# Patient Record
Sex: MALE | Race: BLACK OR AFRICAN AMERICAN | NOT HISPANIC OR LATINO | Employment: FULL TIME | URBAN - METROPOLITAN AREA
[De-identification: names, ages, dates, MRNs, and addresses within clinical notes are randomized per-mention and may not be internally consistent; named-entity substitution may affect disease eponyms.]

---

## 2021-07-13 ENCOUNTER — TRANSCRIBE ORDERS (OUTPATIENT)
Dept: PHYSICAL THERAPY | Facility: CLINIC | Age: 56
End: 2021-07-13

## 2021-07-13 DIAGNOSIS — M47.818 SI JOINT ARTHRITIS: ICD-10-CM

## 2021-07-13 DIAGNOSIS — M46.1 INFLAMMATION OF SACROILIAC JOINT (HCC): Primary | ICD-10-CM

## 2021-07-28 ENCOUNTER — TREATMENT (OUTPATIENT)
Dept: PHYSICAL THERAPY | Facility: CLINIC | Age: 56
End: 2021-07-28

## 2021-07-28 DIAGNOSIS — M54.50 ACUTE BILATERAL LOW BACK PAIN WITHOUT SCIATICA: Primary | ICD-10-CM

## 2021-07-28 PROCEDURE — 97161 PT EVAL LOW COMPLEX 20 MIN: CPT | Performed by: PHYSICAL THERAPIST

## 2021-07-28 PROCEDURE — 97110 THERAPEUTIC EXERCISES: CPT | Performed by: PHYSICAL THERAPIST
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
## 2021-08-04 ENCOUNTER — TREATMENT (OUTPATIENT)
Dept: PHYSICAL THERAPY | Facility: CLINIC | Age: 56
End: 2021-08-04

## 2021-08-04 DIAGNOSIS — M54.50 ACUTE BILATERAL LOW BACK PAIN WITHOUT SCIATICA: Primary | ICD-10-CM

## 2021-08-04 PROCEDURE — 97110 THERAPEUTIC EXERCISES: CPT | Performed by: PHYSICAL THERAPIST

## 2021-08-04 NOTE — PROGRESS NOTES
Physical Therapy Daily Treatment Note    Patient: Gorge Newman   : 1965  Referring practitioner: Alden Samuel MD  Date of Initial Visit: Type: THERAPY  Noted: 2021  Today's Date: 2021  Patient seen for 2 sessions         Subjective   Gorge Newman reports: Pt reports that he is doing okay, but his shoulder pain is high.      Objective   See Exercise, Manual, and Modality Logs for complete treatment.     Assessment & Plan     Assessment  Impairments: pain with function  Assessment details: Pt tolerated session well overall without complaint of increased pain. He required cues for some exercises to ensure proper form. PT is still awaiting order for shoulder pain.    Plan  Therapy options: will be seen for skilled physical therapy services  Plan details: Continue with POC.      Visit Diagnoses:    ICD-10-CM ICD-9-CM   1. Acute bilateral low back pain without sciatica  M54.5 724.2     338.19     Progress per Plan of Care       Timed:  Manual Therapy:    0     mins  06544;  Therapeutic Exercise:    28     mins  85000;     Neuromuscular Pooja:    0    mins  27836;    Therapeutic Activity:     0     mins  73650;     Gait Trainin     mins  09636;     Aquatic Therapy     0     mins  10977;     Ultrasound:     0     mins  66848;    Electrical Stimulation:    0     mins  58001 ( );    Untimed:  Electrical Stimulation:    0     mins  95461 ( );  Mechanical Traction:    0     mins  18056;     Timed Treatment:   28   mins   Total Treatment:     28   mins  Maria Guadalupe Melchor, PT  Physical Therapist

## 2021-08-10 ENCOUNTER — TREATMENT (OUTPATIENT)
Dept: PHYSICAL THERAPY | Facility: CLINIC | Age: 56
End: 2021-08-10

## 2021-08-10 DIAGNOSIS — M25.512 ACUTE PAIN OF LEFT SHOULDER: ICD-10-CM

## 2021-08-10 DIAGNOSIS — M54.50 ACUTE BILATERAL LOW BACK PAIN WITHOUT SCIATICA: Primary | ICD-10-CM

## 2021-08-10 PROCEDURE — 97110 THERAPEUTIC EXERCISES: CPT | Performed by: PHYSICAL THERAPIST

## 2021-08-10 PROCEDURE — 97164 PT RE-EVAL EST PLAN CARE: CPT | Performed by: PHYSICAL THERAPIST

## 2021-08-10 PROCEDURE — 97140 MANUAL THERAPY 1/> REGIONS: CPT | Performed by: PHYSICAL THERAPIST
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
## 2021-08-19 ENCOUNTER — TREATMENT (OUTPATIENT)
Dept: PHYSICAL THERAPY | Facility: CLINIC | Age: 56
End: 2021-08-19

## 2021-08-19 DIAGNOSIS — M25.512 ACUTE PAIN OF LEFT SHOULDER: ICD-10-CM

## 2021-08-19 DIAGNOSIS — M54.50 ACUTE BILATERAL LOW BACK PAIN WITHOUT SCIATICA: Primary | ICD-10-CM

## 2021-08-19 PROCEDURE — 97140 MANUAL THERAPY 1/> REGIONS: CPT | Performed by: PHYSICAL THERAPIST

## 2021-08-19 PROCEDURE — G0283 ELEC STIM OTHER THAN WOUND: HCPCS | Performed by: PHYSICAL THERAPIST

## 2021-08-19 PROCEDURE — 97110 THERAPEUTIC EXERCISES: CPT | Performed by: PHYSICAL THERAPIST

## 2021-08-19 NOTE — PROGRESS NOTES
Physical Therapy Daily Treatment Note      Patient: Gorge Newman   : 1965  Referring practitioner: No ref. provider found  Date of Initial Visit: Type: THERAPY  Noted: 2021  Today's Date: 2021  Patient seen for 4 sessions           Subjective   Gorge Newman reports: His shoulder is getting better.     Objective   See Exercise, Manual, and Modality Logs for complete treatment.     Assessment & Plan     Assessment  Assessment details: Pt tolerated session well overall. Progressed exercises as he is making progress.     Plan  Therapy options: will be seen for skilled physical therapy services  Plan details: Continue with POC.        Visit Diagnoses:    ICD-10-CM ICD-9-CM   1. Acute bilateral low back pain without sciatica  M54.5 724.2     338.19   2. Acute pain of left shoulder  M25.512 719.41       Progress per Plan of Care         Timed:  Manual Therapy:    10     mins  08244;  Therapeutic Exercise:    20     mins  19288;     Neuromuscular Pooja:    0    mins  42259;    Therapeutic Activity:     0     mins  95703;     Gait Trainin     mins  93738;     Aquatic Therapy     0     mins  99082;     Ultrasound:     0     mins  99493;    Electrical Stimulation:    0     mins  76594 ( );    Untimed:  Electrical Stimulation:    15     mins  60450 ( );  Mechanical Traction:    0     mins  06711;     Timed Treatment:   30  mins   Total Treatment:     45   mins  Maria Guadalupe Melchor, PT  Physical Therapist

## 2021-08-23 ENCOUNTER — TREATMENT (OUTPATIENT)
Dept: PHYSICAL THERAPY | Facility: CLINIC | Age: 56
End: 2021-08-23

## 2021-08-23 DIAGNOSIS — M25.512 ACUTE PAIN OF LEFT SHOULDER: ICD-10-CM

## 2021-08-23 DIAGNOSIS — M54.50 ACUTE BILATERAL LOW BACK PAIN WITHOUT SCIATICA: Primary | ICD-10-CM

## 2021-08-23 PROCEDURE — 97110 THERAPEUTIC EXERCISES: CPT | Performed by: PHYSICAL THERAPIST

## 2021-08-23 NOTE — PROGRESS NOTES
Physical Therapy Daily Treatment Note      Patient: Gorge Newman   : 1965  Referring practitioner: No ref. provider found  Date of Initial Visit: Type: THERAPY  Noted: 2021  Today's Date: 2021  Patient seen for 5 sessions           Subjective Evaluation    History of Present Illness    Subjective comment: Pt reports that he is doing well today and his pain is reduced.       Objective   See Exercise, Manual, and Modality Logs for complete treatment.     Assessment & Plan     Assessment  Assessment details: Pt tolerated session well today. Progressed all exercises due to progress and reduction in pain.    Plan  Plan details: Continue with POC.        Visit Diagnoses:    ICD-10-CM ICD-9-CM   1. Acute bilateral low back pain without sciatica  M54.5 724.2     338.19   2. Acute pain of left shoulder  M25.512 719.41       Progress per Plan of Care           Timed:  Manual Therapy:    0     mins  93628;  Therapeutic Exercise:    25     mins  74624;     Neuromuscular Pooja:    0    mins  19562;    Therapeutic Activity:     0     mins  80481;     Gait Trainin     mins  75526;     Aquatic Therapy     00     mins  17300;     Ultrasound:     0     mins  10204;    Electrical Stimulation:    0     mins  10103 ( );    Untimed:  Electrical Stimulation:    0     mins  53804 ( );  Mechanical Traction:    0     mins  92076;     Timed Treatment:   25   mins   Total Treatment:     25   mins  Maria Guadalupe Melchor, PT  Physical Therapist

## 2021-09-13 ENCOUNTER — TREATMENT (OUTPATIENT)
Dept: PHYSICAL THERAPY | Facility: CLINIC | Age: 56
End: 2021-09-13

## 2021-09-13 DIAGNOSIS — M54.50 ACUTE BILATERAL LOW BACK PAIN WITHOUT SCIATICA: ICD-10-CM

## 2021-09-13 DIAGNOSIS — M25.512 ACUTE PAIN OF LEFT SHOULDER: Primary | ICD-10-CM

## 2021-09-13 NOTE — PROGRESS NOTES
Physical Therapy Daily Treatment Note and discharge    Patient: Gorge Newman   : 1965  Referring practitioner: No ref. provider found  Date of Initial Visit: Type: THERAPY  Noted: 2021  Today's Date: 2021  Patient seen for 6 sessions           Subjective   Gorge Newman reports: He is doing a lot better. He reports that his back pain is now 1/10 overall and is doing well. Pt reports that his shoulder bothers him a little; however, it is significantly better (4-5/10 pain overall). Pt feels that he is ready to discharge to an independent [facility] and he states that if his shoulder does not get better, he will return to the MD for a follow up and request further physical therapy.     Objective          Active Range of Motion     Additional Active Range of Motion Details  Lumbar  Active Range              Flexion   75%    Extension  70%         Left Right   Sidebending  70% 70%   Rotation  60% 60%       Strength/Myotome Testing     Left Shoulder     Planes of Motion   Flexion: 4+   Abduction: 4+   External rotation at 0°: 4+   Internal rotation at 0°: 4+     Right Shoulder     Planes of Motion   Flexion: 4+   Abduction: 4+   External rotation at 0°: 4   Internal rotation at 0°: 4     Left Hip   Planes of Motion   Flexion: 4+  Extension: 4+  Abduction: 4+  Adduction: 4+    Right Hip   Planes of Motion   Flexion: 4+  Abduction: 4+  Adduction: 4+    Ambulation     Observational Gait   Decreased walking speed.       See Exercise, Manual, and Modality Logs for complete treatment.     Assessment & Plan     Assessment  Impairments: lacks appropriate home exercise program and pain with function  Assessment details: Pt has made good overall improvements in his lumbar pain and shoulder pain. He is now experience minimal pain and has good overall UE and LE strength. Pt rathersto do exercises at home and follow up at a further date for additional physical therapy.    Goals  Plan Goals: LOW BACK PROBLEMS:    1. The  patient complains of low back pain.  LTG 1: 6 weeks:  The patient will report a pain rating of 3/10 or better in order to improve  tolerance to activities of daily living and improve sleep quality.  STATUS:  New  STG 1a: 3 weeks:  The patient will report a pain rating of 5/10 or better.  STATUS:  New  TREATMENT:  Therapeutic exercises, manual therapy, aquatic therapy, home exercise   instruction, and modalities as needed for pain to include:  electrical stimulation, moist heat, ice,   ultrasound, and diathermy.      2. The patient demonstrates weakness of the B hip.  LTG 2: 6 weeks:  The patient will demonstrate 4+ /5 strength for B hip flexion, abduction,  and extension in order to improve hip stability.  STATUS:  New  STG 2a: 3 weeks:  The patient will demonstrate 4 /5 strength for B hip flexion, abduction,  and extension.  STATUS:  New  TREATMENT: Therapeutic exercises, manual therapy, aquatic therapy, home exercise instruction,  and modalities as needed for pain to include:  electrical stimulation, moist heat, ice, ultrasound, and   diathermy.      3. The patient has gait dysfunction.  LTG 3: 6 weeks:  The patient will ambulate without assistive device, independently, for   community distances with minimal limp to the B lower extremity in order to improve mobility and allow   patient to perform activities such as grocery shopping with greater ease.  STATUS:  New  TREATMENT: Gait training, aquatic therapy, therapeutic exercise, and home exercise instruction.    4. The patient has limited lumbar AROM  LTG 4: 6 weeks:  The patient will demonstrate lumbar AROM as follows: 90% of flexion.  STATUS:  New  STG 4a: 3 weeks: The patient will demonstrate lumbar AROM as follows: 80% of flexion and 60% degrees of extension.  STATUS:  New  TREATMENT: Manual therapy, therapeutic exercise, home exercise instruction, and modalities as needed to include: moist heat, electrical stimulation, and ultrasound.    Plan  Therapy  options: will be seen for skilled physical therapy services  Planned therapy interventions: manual therapy, strengthening, soft tissue mobilization and home exercise program  Plan details: Discharge to home exercises.         Visit Diagnoses:  No diagnosis found.    Other: discharge to [facility].           Timed:  Manual Therapy:    0     mins  99760;  Therapeutic Exercise:    0     mins  23014;     Neuromuscular Pooja:    0    mins  82067;    Therapeutic Activity:     0     mins  91983;     Gait Trainin     mins  35154;     Aquatic Therapy     0     mins  55320;     Ultrasound:     0     mins  18646;    Electrical Stimulation:    0     mins  01371 ( );    Untimed:  Electrical Stimulation:    0     mins  94903 ( );  Mechanical Traction:    0     mins  90680;     Timed Treatment:   0   mins   Total Treatment:     0   mins  Maria Guadalupe Melchor PT  Physical Therapist

## 2021-12-15 PROCEDURE — U0004 COV-19 TEST NON-CDC HGH THRU: HCPCS | Performed by: NURSE PRACTITIONER

## 2023-01-20 ENCOUNTER — OFFICE VISIT (OUTPATIENT)
Dept: FAMILY MEDICINE CLINIC | Facility: CLINIC | Age: 58
End: 2023-01-20
Payer: COMMERCIAL

## 2023-01-20 ENCOUNTER — PATIENT ROUNDING (BHMG ONLY) (OUTPATIENT)
Dept: FAMILY MEDICINE CLINIC | Facility: CLINIC | Age: 58
End: 2023-01-20
Payer: COMMERCIAL

## 2023-01-20 VITALS
SYSTOLIC BLOOD PRESSURE: 150 MMHG | OXYGEN SATURATION: 100 % | BODY MASS INDEX: 24.98 KG/M2 | WEIGHT: 178.4 LBS | HEIGHT: 71 IN | DIASTOLIC BLOOD PRESSURE: 94 MMHG | HEART RATE: 69 BPM

## 2023-01-20 DIAGNOSIS — E78.5 HYPERLIPIDEMIA, UNSPECIFIED HYPERLIPIDEMIA TYPE: ICD-10-CM

## 2023-01-20 DIAGNOSIS — I10 HYPERTENSION, UNSPECIFIED TYPE: Primary | ICD-10-CM

## 2023-01-20 DIAGNOSIS — Z23 NEED FOR TETANUS, DIPHTHERIA, AND ACELLULAR PERTUSSIS (TDAP) VACCINE: ICD-10-CM

## 2023-01-20 PROCEDURE — 99203 OFFICE O/P NEW LOW 30 MIN: CPT | Performed by: NURSE PRACTITIONER

## 2023-01-20 PROCEDURE — 90471 IMMUNIZATION ADMIN: CPT | Performed by: NURSE PRACTITIONER

## 2023-01-20 PROCEDURE — 90715 TDAP VACCINE 7 YRS/> IM: CPT | Performed by: NURSE PRACTITIONER

## 2023-01-20 RX ORDER — SILDENAFIL 100 MG/1
100 TABLET, FILM COATED ORAL DAILY PRN
COMMUNITY
Start: 2023-01-12

## 2023-01-20 RX ORDER — ATORVASTATIN CALCIUM 20 MG/1
20 TABLET, FILM COATED ORAL DAILY
COMMUNITY
Start: 2023-01-12 | End: 2023-02-24 | Stop reason: SDUPTHER

## 2023-01-20 RX ORDER — LISINOPRIL AND HYDROCHLOROTHIAZIDE 25; 20 MG/1; MG/1
1 TABLET ORAL DAILY
COMMUNITY
Start: 2023-01-12 | End: 2023-02-24 | Stop reason: SDUPTHER

## 2023-01-20 RX ORDER — DICLOFENAC SODIUM 75 MG/1
TABLET, DELAYED RELEASE ORAL
COMMUNITY
Start: 2022-12-15

## 2023-01-20 RX ORDER — AMLODIPINE BESYLATE 5 MG/1
5 TABLET ORAL DAILY
Qty: 90 TABLET | Refills: 0 | Status: SHIPPED | OUTPATIENT
Start: 2023-01-20 | End: 2023-02-24 | Stop reason: SDUPTHER

## 2023-01-20 RX ORDER — BENZONATATE 200 MG/1
CAPSULE ORAL
COMMUNITY
Start: 2023-01-13

## 2023-01-20 RX ORDER — LORATADINE 10 MG/1
10 TABLET ORAL DAILY
COMMUNITY
Start: 2023-01-13

## 2023-01-20 NOTE — ASSESSMENT & PLAN NOTE
Lipid abnormalities are unknown at this time.  Will obtain labs from prior PCP and collect Lipid Panel in 1 month..  Continue Atorvastatin 20mg daily as previously prescribed.  Lipids will be reassessed 1 month..

## 2023-01-20 NOTE — PROGRESS NOTES
"Chief Complaint  Hypertension, Med Refill, and Establish Care    Subjective          Gorge Newman presents to [facility]  History of Present Illness  Gorge Newman is a 57 year old male here to establish care.  He recently moved from [place] to [place] and was going to [facility], and saw Dr. Alden Samuel.  He is also here to follow-up on HTN.    HTN - currently taking Lisinopril-HCTZ daily and tolerating well.  Denies SOB, chest pain, heart palpitations.    HLD - currently taking Atorvastatin and denies side effects to medications.     He reports just got refills of all medications about 2 weeks ago and denies needing refills today.    He denies any other complaints today.             Review of Systems   Respiratory: Negative for shortness of breath.    Cardiovascular: Negative for chest pain, palpitations and leg swelling.         Objective   Vital Signs:   /94 (BP Location: Left arm)   Pulse 69   Ht 180.3 cm (71\")   Wt 80.9 kg (178 lb 6.4 oz)   SpO2 100%   BMI 24.88 kg/m²     Physical Exam  Vitals and nursing note reviewed.   Constitutional:       Appearance: Normal appearance.   HENT:      Head: Normocephalic and atraumatic.   Eyes:      Conjunctiva/sclera: Conjunctivae normal.   Cardiovascular:      Rate and Rhythm: Normal rate and regular rhythm.   Pulmonary:      Effort: Pulmonary effort is normal. No respiratory distress.      Breath sounds: Normal breath sounds. No wheezing, rhonchi or rales.   Skin:     General: Skin is warm and dry.   Neurological:      Mental Status: He is alert.   Psychiatric:         Mood and Affect: Mood normal.        Result Review :                 Assessment and Plan    Diagnoses and all orders for this visit:    1. Hypertension, unspecified type (Primary)  Assessment & Plan:  Hypertension is Uncontrolled on Lisinopril/HCTZ today..  Dietary sodium restriction.  Regular aerobic exercise.  Medication changes per " orders.   Continue Lisinopril-HCTZ 20-25mg daily  Start Amlodpine 5mg daily.  Blood pressure will be reassessed in 4 weeks.    Orders:  -     amLODIPine (NORVASC) 5 MG tablet; Take 1 tablet by mouth Daily.  Dispense: 90 tablet; Refill: 0    2. Hyperlipidemia, unspecified hyperlipidemia type  Assessment & Plan:  Lipid abnormalities are unknown at this time.  Will obtain labs from prior PCP and collect Lipid Panel in 1 month..  Continue Atorvastatin 20mg daily as previously prescribed.  Lipids will be reassessed 1 month..      3. Need for tetanus, diphtheria, and acellular pertussis (Tdap) vaccine  Comments:  Discussed importance of and recommended Tdap today and patient agreed.  Orders:  -     Tdap Vaccine Greater Than or Equal To 6yo IM      Follow Up   Return in about 1 month (around 2/20/2023) for Follow-up Blood Pressure - medication change & get labs at this appt..  Patient was given instructions and counseling regarding his condition or for health maintenance advice. Please see specific information pulled into the AVS if appropriate.

## 2023-01-20 NOTE — ASSESSMENT & PLAN NOTE
Hypertension is Uncontrolled on Lisinopril/HCTZ today..  Dietary sodium restriction.  Regular aerobic exercise.  Medication changes per orders.   Continue Lisinopril-HCTZ 20-25mg daily  Start Amlodpine 5mg daily.  Blood pressure will be reassessed in 4 weeks.

## 2023-02-24 ENCOUNTER — OFFICE VISIT (OUTPATIENT)
Dept: FAMILY MEDICINE CLINIC | Facility: CLINIC | Age: 58
End: 2023-02-24
Payer: COMMERCIAL

## 2023-02-24 VITALS
HEIGHT: 71 IN | WEIGHT: 175 LBS | HEART RATE: 80 BPM | OXYGEN SATURATION: 95 % | RESPIRATION RATE: 16 BRPM | SYSTOLIC BLOOD PRESSURE: 120 MMHG | BODY MASS INDEX: 24.5 KG/M2 | DIASTOLIC BLOOD PRESSURE: 70 MMHG

## 2023-02-24 DIAGNOSIS — E78.5 HYPERLIPIDEMIA, UNSPECIFIED HYPERLIPIDEMIA TYPE: ICD-10-CM

## 2023-02-24 DIAGNOSIS — I10 HYPERTENSION, UNSPECIFIED TYPE: Primary | ICD-10-CM

## 2023-02-24 PROCEDURE — 99213 OFFICE O/P EST LOW 20 MIN: CPT | Performed by: NURSE PRACTITIONER

## 2023-02-24 RX ORDER — LISINOPRIL AND HYDROCHLOROTHIAZIDE 25; 20 MG/1; MG/1
1 TABLET ORAL DAILY
Qty: 90 TABLET | Refills: 0 | Status: SHIPPED | OUTPATIENT
Start: 2023-02-24

## 2023-02-24 RX ORDER — AMLODIPINE BESYLATE 5 MG/1
5 TABLET ORAL DAILY
Qty: 90 TABLET | Refills: 0 | Status: SHIPPED | OUTPATIENT
Start: 2023-02-24

## 2023-02-24 RX ORDER — ATORVASTATIN CALCIUM 20 MG/1
20 TABLET, FILM COATED ORAL NIGHTLY
Qty: 90 TABLET | Refills: 0 | Status: SHIPPED | OUTPATIENT
Start: 2023-02-24

## 2023-02-24 NOTE — ASSESSMENT & PLAN NOTE
Hypertension is improving with treatment.  Continue current treatment regimen.  Dietary sodium restriction.  Weight loss.  Regular aerobic exercise.   Labs (CBC, CMP, Lipid Panel) to be done at next visit, patient will fast for labs.  Blood pressure will be reassessed in 3 months.

## 2023-02-24 NOTE — PROGRESS NOTES
"Chief Complaint  Hypertension (F/U)    Subjective          Gorge Newman presents to [facility]  History of Present Illness  Gorge Newman is a 57 year old male here to follow-up on HTN.    HTN - he is currently taking Amlodipine 5mg daily, and Lisinopril 20mg-HCTZ 25mg daily.  He denies SOB, chest pain, heart palpitations and leg swelling.    HLD - he is currently taking Atorvastatin 20mg nightly.  He denies myalgia.    He denies any other complaints today.  He is requesting refills of above medications today.    Discussed getting labs (CBC, CMP, Lipid Panel) today, but our lab department is closed today.  Gave patient option to go over to hospital to do labs or we can do fasting labs at next appointment and patient opted to fast for next appointment and do labs at that time.      Review of Systems   Respiratory: Negative for shortness of breath.    Cardiovascular: Negative for chest pain, palpitations and leg swelling.   Neurological: Negative for dizziness, light-headedness and headaches.         Objective   Vital Signs:   /70   Pulse 80   Resp 16   Ht 180.3 cm (71\")   Wt 79.4 kg (175 lb)   SpO2 95%   BMI 24.41 kg/m²     Physical Exam  Vitals and nursing note reviewed.   Constitutional:       General: He is not in acute distress.     Appearance: Normal appearance. He is not ill-appearing.   HENT:      Head: Normocephalic and atraumatic.   Eyes:      Conjunctiva/sclera: Conjunctivae normal.      Pupils: Pupils are equal, round, and reactive to light.   Cardiovascular:      Rate and Rhythm: Normal rate and regular rhythm.      Heart sounds: Normal heart sounds. No murmur heard.  Pulmonary:      Effort: Pulmonary effort is normal. No respiratory distress.      Breath sounds: Normal breath sounds. No wheezing.   Skin:     General: Skin is warm and dry.   Neurological:      Mental Status: He is alert.   Psychiatric:         Mood and Affect: Mood normal.        Result Review : "                 Assessment and Plan    Diagnoses and all orders for this visit:    1. Hypertension, unspecified type (Primary)  Assessment & Plan:  Hypertension is improving with treatment.  Continue current treatment regimen.  Dietary sodium restriction.  Weight loss.  Regular aerobic exercise.   Labs (CBC, CMP, Lipid Panel) to be done at next visit, patient will fast for labs.  Blood pressure will be reassessed in 3 months.    Orders:  -     lisinopril-hydrochlorothiazide (PRINZIDE,ZESTORETIC) 20-25 MG per tablet; Take 1 tablet by mouth Daily.  Dispense: 90 tablet; Refill: 0  -     amLODIPine (NORVASC) 5 MG tablet; Take 1 tablet by mouth Daily.  Dispense: 90 tablet; Refill: 0    2. Hyperlipidemia, unspecified hyperlipidemia type  Assessment & Plan:  Unknown cholesterol level, as patient transferred to us from PCP in [place] and we do not have records for him at this time.  Our lab department in office is closed now.  Recommended patient fast (no food/drink) and do labs at next appointment in 3 months for physical and patient agreed.    Orders:  -     atorvastatin (LIPITOR) 20 MG tablet; Take 1 tablet by mouth Every Night.  Dispense: 90 tablet; Refill: 0      Follow Up   Return in about 3 months (around 5/24/2023) for Annual physical W/LABS same day.  Patient was given instructions and counseling regarding his condition or for health maintenance advice. Please see specific information pulled into the AVS if appropriate.

## 2023-02-24 NOTE — ASSESSMENT & PLAN NOTE
Unknown cholesterol level, as patient transferred to us from PCP in [place] and we do not have records for him at this time.  Our lab department in office is closed now.  Recommended patient fast (no food/drink) and do labs at next appointment in 3 months for physical and patient agreed.

## 2023-04-17 ENCOUNTER — OFFICE VISIT (OUTPATIENT)
Dept: FAMILY MEDICINE CLINIC | Facility: CLINIC | Age: 58
End: 2023-04-17
Payer: COMMERCIAL

## 2023-04-17 VITALS
HEIGHT: 71 IN | DIASTOLIC BLOOD PRESSURE: 80 MMHG | WEIGHT: 175 LBS | OXYGEN SATURATION: 98 % | HEART RATE: 58 BPM | SYSTOLIC BLOOD PRESSURE: 130 MMHG | BODY MASS INDEX: 24.5 KG/M2

## 2023-04-17 DIAGNOSIS — Z11.4 ENCOUNTER FOR SCREENING FOR HUMAN IMMUNODEFICIENCY VIRUS (HIV): ICD-10-CM

## 2023-04-17 DIAGNOSIS — I10 HYPERTENSION, UNSPECIFIED TYPE: ICD-10-CM

## 2023-04-17 DIAGNOSIS — Z00.00 ENCOUNTER FOR ANNUAL PHYSICAL EXAM: Primary | ICD-10-CM

## 2023-04-17 DIAGNOSIS — Z11.59 NEED FOR HEPATITIS B SCREENING TEST: ICD-10-CM

## 2023-04-17 DIAGNOSIS — G89.29 CHRONIC LEFT SHOULDER PAIN: ICD-10-CM

## 2023-04-17 DIAGNOSIS — Z11.59 ENCOUNTER FOR HEPATITIS C SCREENING TEST FOR LOW RISK PATIENT: ICD-10-CM

## 2023-04-17 DIAGNOSIS — E78.5 HYPERLIPIDEMIA, UNSPECIFIED HYPERLIPIDEMIA TYPE: ICD-10-CM

## 2023-04-17 DIAGNOSIS — M25.512 CHRONIC LEFT SHOULDER PAIN: ICD-10-CM

## 2023-04-17 DIAGNOSIS — Z12.5 PROSTATE CANCER SCREENING: ICD-10-CM

## 2023-04-17 DIAGNOSIS — R00.1 BRADYCARDIA: ICD-10-CM
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
## 2023-04-21 DIAGNOSIS — I10 HYPERTENSION, UNSPECIFIED TYPE: ICD-10-CM

## 2023-04-21 RX ORDER — AMLODIPINE BESYLATE 5 MG/1
5 TABLET ORAL DAILY
Qty: 30 TABLET | Refills: 0 | Status: SHIPPED | OUTPATIENT
Start: 2023-04-21

## 2023-04-21 NOTE — TELEPHONE ENCOUNTER
Rx Refill Note  Requested Prescriptions     Pending Prescriptions Disp Refills   • amLODIPine (NORVASC) 5 MG tablet [Pharmacy Med Name: AMLODIPINE BESYLATE 5MG TABLETS] 90 tablet 0     Sig: TAKE 1 TABLET BY MOUTH DAILY      Last office visit with prescribing clinician: 4/17/2023   Last telemedicine visit with prescribing clinician: 5/1/2023   Next office visit with prescribing clinician: 7/17/2023                         Would you like a call back once the refill request has been completed: [] Yes [] No    If the office needs to give you a call back, can they leave a voicemail: [] Yes [] No    Aidan Bello MA  04/21/23, 07:56 EDT

## 2023-04-28 PROBLEM — G89.29 CHRONIC LEFT SHOULDER PAIN: Status: ACTIVE | Noted: 2023-04-28

## 2023-04-28 PROBLEM — R00.1 BRADYCARDIA: Status: ACTIVE | Noted: 2023-04-28

## 2023-04-28 PROBLEM — M25.512 CHRONIC LEFT SHOULDER PAIN: Status: ACTIVE | Noted: 2023-04-28
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
## 2023-04-29 NOTE — ASSESSMENT & PLAN NOTE
"Lipid abnormalities are unknown, awaiting lab results.  Nutritional counseling was provided.   Continue Atorvastatin as previously directed.  Lipids will be reassessed when patient returns within 2 weeks for \"fasting\" labs..  "

## 2023-05-03 DIAGNOSIS — Z11.59 NEED FOR HEPATITIS B SCREENING TEST: Primary | ICD-10-CM

## 2023-05-03 NOTE — PROGRESS NOTES
"Hepatitis B Antibody \"reactive\", ordered further Hepatitis B labs to determine if Hepatitis B is active past/present infection or if patient is immune.  "

## 2023-05-24 DIAGNOSIS — I10 HYPERTENSION, UNSPECIFIED TYPE: ICD-10-CM

## 2023-05-24 RX ORDER — AMLODIPINE BESYLATE 5 MG/1
TABLET ORAL
Qty: 30 TABLET | Refills: 0 | Status: SHIPPED | OUTPATIENT
Start: 2023-05-24

## 2023-05-31 DIAGNOSIS — I10 HYPERTENSION, UNSPECIFIED TYPE: ICD-10-CM

## 2023-05-31 RX ORDER — LISINOPRIL AND HYDROCHLOROTHIAZIDE 25; 20 MG/1; MG/1
1 TABLET ORAL DAILY
Qty: 90 TABLET | Refills: 0 | Status: SHIPPED | OUTPATIENT
Start: 2023-05-31

## 2023-06-05 ENCOUNTER — OFFICE VISIT (OUTPATIENT)
Dept: FAMILY MEDICINE CLINIC | Facility: CLINIC | Age: 58
End: 2023-06-05
Payer: COMMERCIAL

## 2023-06-05 VITALS
SYSTOLIC BLOOD PRESSURE: 124 MMHG | RESPIRATION RATE: 14 BRPM | OXYGEN SATURATION: 99 % | DIASTOLIC BLOOD PRESSURE: 82 MMHG | BODY MASS INDEX: 23.66 KG/M2 | WEIGHT: 169 LBS | HEART RATE: 73 BPM | HEIGHT: 71 IN

## 2023-06-05 DIAGNOSIS — I10 HYPERTENSION, UNSPECIFIED TYPE: Primary | ICD-10-CM

## 2023-06-05 DIAGNOSIS — J06.9 ACUTE URI: ICD-10-CM

## 2023-06-05 DIAGNOSIS — E78.5 HYPERLIPIDEMIA, UNSPECIFIED HYPERLIPIDEMIA TYPE: ICD-10-CM

## 2023-06-05 PROCEDURE — 99214 OFFICE O/P EST MOD 30 MIN: CPT | Performed by: NURSE PRACTITIONER

## 2023-06-05 RX ORDER — ATORVASTATIN CALCIUM 20 MG/1
20 TABLET, FILM COATED ORAL NIGHTLY
Qty: 90 TABLET | Refills: 0 | Status: CANCELLED | OUTPATIENT
Start: 2023-06-05

## 2023-06-05 RX ORDER — ATORVASTATIN CALCIUM 20 MG/1
20 TABLET, FILM COATED ORAL NIGHTLY
Qty: 90 TABLET | Refills: 0 | Status: SHIPPED | OUTPATIENT
Start: 2023-06-05

## 2023-06-05 RX ORDER — LORATADINE 10 MG/1
10 TABLET ORAL DAILY
Qty: 90 TABLET | Refills: 1 | Status: SHIPPED | OUTPATIENT
Start: 2023-06-05

## 2023-06-05 RX ORDER — AMLODIPINE BESYLATE 5 MG/1
5 TABLET ORAL DAILY
Qty: 30 TABLET | Refills: 0 | Status: SHIPPED | OUTPATIENT
Start: 2023-06-05

## 2023-06-05 RX ORDER — AMLODIPINE BESYLATE 5 MG/1
5 TABLET ORAL DAILY
Qty: 30 TABLET | Refills: 0 | Status: CANCELLED | OUTPATIENT
Start: 2023-06-05

## 2023-06-05 NOTE — PROGRESS NOTES
"Chief Complaint  Hypertension (Cough and nasal congestion ) and Cough    Subjective          Gorge Newman presents to [facility] for  History of Present Illness  He is here to follow-up on HYPERTENSION and with complaint of Cough.    Cough - he is here with dry cough that started on Wednesday last week felt warm (did not check temperature) with chills and congestion.  He denies sneezing, sore throat, runny nose, and facial pain/pressure.  He went to his doctor in [place] and was given Tessalon Perle 200mg to take TID, but patient only taking it daily.  He is also taking Robitussin and has been taking it daily over the last 3 days, without relief.    HYPERTENSION - he takes his Amlodipine 5mg daily.  He denies SOB, chest pain, heart palpitations, lightheadedness and dizziness.    HYPERLIPIDEMIA - he is taking Atorvastatin 20mg daily.  He denies myalgia.    Colonoscopy - is currently scheduled for to have colonoscopy on Tuesday 6/13/23, at 3pm.        Review of Systems   Constitutional:  Positive for chills.        Felt like had fever, did not check temperature.   HENT:  Positive for congestion. Negative for ear discharge, ear pain, postnasal drip, rhinorrhea, sinus pressure, sinus pain, sneezing, sore throat and trouble swallowing.    Eyes:  Negative for pain and discharge.   Respiratory:  Positive for cough. Negative for shortness of breath.         Dry cough   Cardiovascular:  Negative for chest pain and palpitations.   Musculoskeletal:  Negative for myalgias.   Neurological:  Negative for dizziness and light-headedness.       Objective   Vital Signs:   /82 (BP Location: Right arm, Patient Position: Sitting, Cuff Size: Adult)   Pulse 73   Resp 14   Ht 180.3 cm (70.98\")   Wt 76.7 kg (169 lb)   SpO2 99%   BMI 23.58 kg/m²     Physical Exam  Vitals and nursing note reviewed.   Constitutional:       General: He is not in acute distress.     Appearance: Normal appearance. "   HENT:      Head: Normocephalic and atraumatic.      Right Ear: Tympanic membrane normal. No tenderness. No middle ear effusion. There is no impacted cerumen. Tympanic membrane is not erythematous.      Left Ear: Tympanic membrane normal. No tenderness.  No middle ear effusion. There is no impacted cerumen. Tympanic membrane is not erythematous.      Nose: No congestion or rhinorrhea.      Right Turbinates: Pale. Not enlarged or swollen.      Left Turbinates: Pale. Not enlarged or swollen.      Right Sinus: No maxillary sinus tenderness or frontal sinus tenderness.      Left Sinus: No maxillary sinus tenderness or frontal sinus tenderness.      Mouth/Throat:      Mouth: Mucous membranes are moist.      Tongue: No lesions. Tongue does not deviate from midline.      Palate: No mass and lesions.      Pharynx: No pharyngeal swelling, oropharyngeal exudate, posterior oropharyngeal erythema or uvula swelling.   Eyes:      Conjunctiva/sclera: Conjunctivae normal.      Pupils: Pupils are equal, round, and reactive to light.   Cardiovascular:      Rate and Rhythm: Normal rate and regular rhythm.      Heart sounds: Normal heart sounds. No murmur heard.  Pulmonary:      Effort: Pulmonary effort is normal. No respiratory distress.      Breath sounds: Normal breath sounds. No wheezing.   Musculoskeletal:      Cervical back: Neck supple. No tenderness.   Lymphadenopathy:      Cervical: No cervical adenopathy.   Skin:     General: Skin is warm and dry.      Findings: No erythema.   Neurological:      Mental Status: He is alert.      Result Review :   The following data was reviewed by: ASHLIE Schmitz on 06/05/2023:  CMP          5/1/2023    08:13   CMP   Glucose 89    BUN 15    Creatinine 1.30    Sodium 138    Potassium 4.0    Chloride 100    Calcium 10.4    Total Protein 7.5    Albumin 4.4    Globulin 3.1    Total Bilirubin 0.5    Alkaline Phosphatase 68    AST (SGOT) 20    ALT (SGPT) 17    BUN/Creatinine Ratio 11.5       CBC w/diff          5/1/2023    08:13   CBC w/Diff   WBC 3.56    RBC 5.40    Hemoglobin 12.1    Hematocrit 38.7    MCV 71.7    MCH 22.4    MCHC 31.3    RDW 17.4    Platelets 341      Lipid Panel          5/1/2023    08:13   Lipid Panel   Total Cholesterol 184    Triglycerides 109    HDL Cholesterol 37    VLDL Cholesterol 20    LDL Cholesterol  127                Assessment and Plan    Diagnoses and all orders for this visit:    1. Hypertension, unspecified type (Primary)  Assessment & Plan:  Hypertension is improving with treatment.  Continue current treatment regimen.  Dietary sodium restriction.  Weight loss.  Regular aerobic exercise.  Continue Amlodipine 5mg daily.  Blood pressure will be reassessed in 3 months.    Orders:  -     amLODIPine (NORVASC) 5 MG tablet; Take 1 tablet by mouth Daily.  Dispense: 30 tablet; Refill: 0    2. Hyperlipidemia, unspecified hyperlipidemia type  Assessment & Plan:  Lipid abnormalities are newly identified.  Pharmacotherapy as ordered.  Continue Atorvastatin 20mg nightly.  Lipids will be reassessed in 3 months.    Orders:  -     atorvastatin (LIPITOR) 20 MG tablet; Take 1 tablet by mouth Every Night.  Dispense: 90 tablet; Refill: 0    3. Acute URI  Comments:  Continue RX (prior PCP) Tessalon Perle 200mg TID  RX Delsym TID & Loratadine daily.  F/U if si/sy worsen.  Orders:  -     dextromethorphan 15 MG/5ML syrup; Take 10 mL by mouth 3 (Three) Times a Day As Needed for Cough.  Dispense: 118 mL; Refill: 0  -     loratadine (CLARITIN) 10 MG tablet; Take 1 tablet by mouth Daily.  Dispense: 90 tablet; Refill: 1        Follow Up   Return in about 3 months (around 9/5/2023) for Next scheduled follow up HTN, HLD.  Patient was given instructions and counseling regarding his condition or for health maintenance advice. Please see specific information pulled into the AVS if appropriate.

## 2023-06-05 NOTE — ASSESSMENT & PLAN NOTE
Lipid abnormalities are newly identified.  Pharmacotherapy as ordered.  Continue Atorvastatin 20mg nightly.  Lipids will be reassessed in 3 months.

## 2023-06-05 NOTE — ASSESSMENT & PLAN NOTE
Hypertension is improving with treatment.  Continue current treatment regimen.  Dietary sodium restriction.  Weight loss.  Regular aerobic exercise.  Continue Amlodipine 5mg daily.  Blood pressure will be reassessed in 3 months.

## 2023-06-13 ENCOUNTER — OFFICE VISIT (OUTPATIENT)
Dept: SURGERY | Facility: CLINIC | Age: 58
End: 2023-06-13
Payer: COMMERCIAL

## 2023-06-13 ENCOUNTER — LAB (OUTPATIENT)
Dept: LAB | Facility: HOSPITAL | Age: 58
End: 2023-06-13
Payer: COMMERCIAL

## 2023-06-13 VITALS
HEART RATE: 73 BPM | SYSTOLIC BLOOD PRESSURE: 124 MMHG | DIASTOLIC BLOOD PRESSURE: 76 MMHG | HEIGHT: 71 IN | OXYGEN SATURATION: 97 % | BODY MASS INDEX: 22.76 KG/M2 | WEIGHT: 162.6 LBS

## 2023-06-13 DIAGNOSIS — D50.9 IRON DEFICIENCY ANEMIA, UNSPECIFIED IRON DEFICIENCY ANEMIA TYPE: ICD-10-CM

## 2023-06-13 DIAGNOSIS — D50.9 IRON DEFICIENCY ANEMIA, UNSPECIFIED IRON DEFICIENCY ANEMIA TYPE: Primary | ICD-10-CM

## 2023-06-13 LAB
ALBUMIN SERPL-MCNC: 4.1 G/DL (ref 3.5–5.2)
ALBUMIN/GLOB SERPL: 1.2 G/DL
ALP SERPL-CCNC: 96 U/L (ref 39–117)
ALT SERPL W P-5'-P-CCNC: 19 U/L (ref 1–41)
ANION GAP SERPL CALCULATED.3IONS-SCNC: 8 MMOL/L (ref 5–15)
AST SERPL-CCNC: 23 U/L (ref 1–40)
BILIRUB SERPL-MCNC: <0.2 MG/DL (ref 0–1.2)
BUN SERPL-MCNC: 20 MG/DL (ref 6–20)
BUN/CREAT SERPL: 17.7 (ref 7–25)
CALCIUM SPEC-SCNC: 9.2 MG/DL (ref 8.6–10.5)
CHLORIDE SERPL-SCNC: 102 MMOL/L (ref 98–107)
CO2 SERPL-SCNC: 30 MMOL/L (ref 22–29)
CREAT SERPL-MCNC: 1.13 MG/DL (ref 0.76–1.27)
DEPRECATED RDW RBC AUTO: 40.9 FL (ref 37–54)
EGFRCR SERPLBLD CKD-EPI 2021: 76.8 ML/MIN/1.73
EOSINOPHIL # BLD MANUAL: 0.19 10*3/MM3 (ref 0–0.4)
EOSINOPHIL NFR BLD MANUAL: 3.1 % (ref 0.3–6.2)
ERYTHROCYTE [DISTWIDTH] IN BLOOD BY AUTOMATED COUNT: 16.4 % (ref 12.3–15.4)
FOLATE SERPL-MCNC: 7.97 NG/ML (ref 4.78–24.2)
GLOBULIN UR ELPH-MCNC: 3.5 GM/DL
GLUCOSE SERPL-MCNC: 128 MG/DL (ref 65–99)
HCT VFR BLD AUTO: 34.8 % (ref 37.5–51)
HGB BLD-MCNC: 10.9 G/DL (ref 13–17.7)
IRON 24H UR-MRATE: 43 MCG/DL (ref 59–158)
IRON SATN MFR SERPL: 13 % (ref 20–50)
LYMPHOCYTES # BLD MANUAL: 2.02 10*3/MM3 (ref 0.7–3.1)
LYMPHOCYTES NFR BLD MANUAL: 3.1 % (ref 5–12)
MCH RBC QN AUTO: 21.9 PG (ref 26.6–33)
MCHC RBC AUTO-ENTMCNC: 31.3 G/DL (ref 31.5–35.7)
MCV RBC AUTO: 70 FL (ref 79–97)
MONOCYTES # BLD: 0.19 10*3/MM3 (ref 0.1–0.9)
NEUTROPHILS # BLD AUTO: 3.67 10*3/MM3 (ref 1.7–7)
NEUTROPHILS NFR BLD MANUAL: 60.4 % (ref 42.7–76)
NRBC BLD AUTO-RTO: 0 /100 WBC (ref 0–0.2)
PLAT MORPH BLD: NORMAL
PLATELET # BLD AUTO: 444 10*3/MM3 (ref 140–450)
PMV BLD AUTO: 9.4 FL (ref 6–12)
POLYCHROMASIA BLD QL SMEAR: ABNORMAL
POTASSIUM SERPL-SCNC: 3.5 MMOL/L (ref 3.5–5.2)
PROT SERPL-MCNC: 7.6 G/DL (ref 6–8.5)
RBC # BLD AUTO: 4.97 10*6/MM3 (ref 4.14–5.8)
SODIUM SERPL-SCNC: 140 MMOL/L (ref 136–145)
TIBC SERPL-MCNC: 319 MCG/DL (ref 298–536)
TRANSFERRIN SERPL-MCNC: 214 MG/DL (ref 200–360)
VARIANT LYMPHS NFR BLD MANUAL: 33.3 % (ref 19.6–45.3)
VIT B12 BLD-MCNC: 453 PG/ML (ref 211–946)
WBC MORPH BLD: NORMAL
WBC NRBC COR # BLD: 6.08 10*3/MM3 (ref 3.4–10.8)

## 2023-06-13 PROCEDURE — 82607 VITAMIN B-12: CPT

## 2023-06-13 PROCEDURE — 85025 COMPLETE CBC W/AUTO DIFF WBC: CPT

## 2023-06-13 PROCEDURE — 85007 BL SMEAR W/DIFF WBC COUNT: CPT

## 2023-06-13 PROCEDURE — 84466 ASSAY OF TRANSFERRIN: CPT

## 2023-06-13 PROCEDURE — 80053 COMPREHEN METABOLIC PANEL: CPT

## 2023-06-13 PROCEDURE — 83540 ASSAY OF IRON: CPT

## 2023-06-13 PROCEDURE — 36415 COLL VENOUS BLD VENIPUNCTURE: CPT

## 2023-06-13 PROCEDURE — 82746 ASSAY OF FOLIC ACID SERUM: CPT

## 2023-06-13 PROCEDURE — 99203 OFFICE O/P NEW LOW 30 MIN: CPT | Performed by: SURGERY

## 2023-06-13 RX ORDER — OMEPRAZOLE 40 MG/1
40 CAPSULE, DELAYED RELEASE ORAL DAILY
Qty: 60 CAPSULE | Refills: 0 | Status: SHIPPED | OUTPATIENT
Start: 2023-06-13

## 2023-06-13 NOTE — PROGRESS NOTES
"CC: Anemia, consult for possible colonoscopy     HPI: Patient is a very pleasant 57-year-old male that was referred by ASHLIE Tipton for evaluation for possible colonoscopy.  The patient reports having no symptoms other than abnormal hemoglobin that prompted he is referred to my office.  He denies any recent change in bowel habits.  He has been on a regular diet and eats without any nausea or vomiting.  He denies constipation or diarrhea.  No rectal bleeding.  Last colonoscopy was 2 years ago and as per patient was normal.  No NSAIDs abuse.  Minimal alcohol use.  Denies any heartburn or regurgitation.  No vomiting     PMH: Hypertension, hyperlipidemia     PSH: Colonoscopy 2021  Laparoscopic appendectomy 2010    MEDS: Amlodipine, atorvastatin, diclofenac, lisinopril hydrochlorothiazide, Claritin, Viagra     ALL: No known drug allergies    FH and SH: Family history is noncontributory.  No smoking, drinks occasionally does not take any drugs     ROS:   Constitutional: denies any weight changes, fatigue or weakness.  HEENT: Denies hearing loss and rhinorrhea  Cardiovascular: denies chest pain, palpitations, edemas.  Respiratory: denies cough, sputum, SOB.  Gastrointestinal: denies N&V, abd pain, diarrhea, constipation.  Genitourinary: denies dysuria, frequency.  Endocrine: denies cold intolerance, lethargy and flushing.  Hem: denies excessive bruising and postop bleeding.  Musculoskeletal: denies weakness, joint swelling, pain or stiffness.  Neuro: denies seizures, CVA, paresthesia, or peripheral neuropathy.   Skin: denies change in nevi, rashes, masses.     PE:   Vitals:    06/13/23 1459   BP: 124/76   BP Location: Left arm   Patient Position: Sitting   Cuff Size: Adult   Pulse: 73   SpO2: 97%   Weight: 73.8 kg (162 lb 9.6 oz)   Height: 180.3 cm (70.98\")     Body mass index is 22.69 kg/m².   Alert and oriented ×3, no acute distress.  Head is normocephalic and atraumatic.  Neck is supple there is no thyromegaly or " lymphadenopathy  Chest is clear bilaterally there is no added sounds  Regular rate and rhythm, no murmurs  Abdomen is soft and nontender, is nondistended, bowel sounds are positive.  There is no rebound or guarding is and there is no peritoneal signs.  No clubbing cyanosis or edema in lower or upper extremities    Diagnostic studies:   Labs: Hemoglobin 12.1, white blood cell count 3.5, platelet 341  Creatinine 1.3, otherwise normal CMP  Hepatitis AMB panel negative for active infection    Assessment and plan    The patient is a very pleasant 57-year-old male with anemia.  No major risk factors other than chronic Voltaren use.  Patient has not been on an antiacid medication for gastric acid protection.  I think he likely has gastritis or gastric ulcer that is likely contributing to his anemia.  He had colonoscopy 2 years ago and this was found to be negative.  Reports from his colonoscopy are not available to me today.  Discussed with the patient about further step.  I think he should undergo repeat CBC, CMP as well as iron profile, folate, B12 levels and occult fecal blood testing x3.  We will follow-up those labs and decide whether the patient will need endoscopic evaluation    Addendum:   Labs reviewed, hemoglobin is 10.9 has dropped 1 g in the last month.  Hematocrit 34.8, platelets 444  Iron profile with low iron and iron saturation, folate and B12 are normal    I called the patient to discuss further step.  I think he should stop taking Voltaren, I will start him on omeprazole 40 mg daily.  I think he should undergo endoscopic evaluation with upper endoscopy and colonoscopy due to iron deficiency anemia.  Pending results we will discuss further steps.  He would need to have bowel preparation and clear liquid diet the day before.  My office will call him to schedule him for upper endoscopy and colonoscopy     Ezekiel Gomez MD  General, Minimally Invasive and Endoscopic Surgery  [facility]      [address], [address]  [place], [place], [address]  P: [phone]  F: [phone]

## 2023-06-14 ENCOUNTER — LAB (OUTPATIENT)
Dept: LAB | Facility: HOSPITAL | Age: 58
End: 2023-06-14
Payer: COMMERCIAL

## 2023-06-14 ENCOUNTER — PREP FOR SURGERY (OUTPATIENT)
Dept: SURGERY | Facility: CLINIC | Age: 58
End: 2023-06-14
Payer: COMMERCIAL

## 2023-06-14 DIAGNOSIS — D50.9 IRON DEFICIENCY ANEMIA, UNSPECIFIED IRON DEFICIENCY ANEMIA TYPE: ICD-10-CM

## 2023-06-14 LAB — HEMOCCULT STL QL: NEGATIVE

## 2023-06-14 PROCEDURE — 82272 OCCULT BLD FECES 1-3 TESTS: CPT

## 2023-06-16 ENCOUNTER — LAB (OUTPATIENT)
Dept: LAB | Facility: HOSPITAL | Age: 58
End: 2023-06-16
Payer: COMMERCIAL

## 2023-06-16 DIAGNOSIS — D50.9 IRON DEFICIENCY ANEMIA, UNSPECIFIED IRON DEFICIENCY ANEMIA TYPE: ICD-10-CM

## 2023-06-16 LAB
HEMOCCULT STL QL: NEGATIVE
HEMOCCULT STL QL: NEGATIVE

## 2023-06-16 PROCEDURE — 82272 OCCULT BLD FECES 1-3 TESTS: CPT

## 2023-07-17 ENCOUNTER — OFFICE VISIT (OUTPATIENT)
Dept: FAMILY MEDICINE CLINIC | Facility: CLINIC | Age: 58
End: 2023-07-17
Payer: COMMERCIAL

## 2023-07-17 VITALS
BODY MASS INDEX: 23.3 KG/M2 | HEART RATE: 83 BPM | RESPIRATION RATE: 16 BRPM | OXYGEN SATURATION: 98 % | SYSTOLIC BLOOD PRESSURE: 124 MMHG | HEIGHT: 71 IN | DIASTOLIC BLOOD PRESSURE: 76 MMHG | TEMPERATURE: 97.7 F | WEIGHT: 166.4 LBS

## 2023-07-17 DIAGNOSIS — D50.9 IRON DEFICIENCY ANEMIA, UNSPECIFIED IRON DEFICIENCY ANEMIA TYPE: ICD-10-CM

## 2023-07-17 DIAGNOSIS — E78.5 HYPERLIPIDEMIA, UNSPECIFIED HYPERLIPIDEMIA TYPE: ICD-10-CM

## 2023-07-17 DIAGNOSIS — I10 HYPERTENSION, UNSPECIFIED TYPE: Primary | ICD-10-CM

## 2023-07-17 PROCEDURE — 99214 OFFICE O/P EST MOD 30 MIN: CPT | Performed by: NURSE PRACTITIONER

## 2023-07-17 RX ORDER — LISINOPRIL AND HYDROCHLOROTHIAZIDE 25; 20 MG/1; MG/1
1 TABLET ORAL DAILY
Qty: 90 TABLET | Refills: 0 | Status: SHIPPED | OUTPATIENT
Start: 2023-07-17

## 2023-07-17 RX ORDER — AMLODIPINE BESYLATE 5 MG/1
5 TABLET ORAL DAILY
Qty: 30 TABLET | Refills: 0 | Status: SHIPPED | OUTPATIENT
Start: 2023-07-17

## 2023-07-17 RX ORDER — ATORVASTATIN CALCIUM 20 MG/1
20 TABLET, FILM COATED ORAL NIGHTLY
Qty: 90 TABLET | Refills: 0 | Status: SHIPPED | OUTPATIENT
Start: 2023-07-17

## 2023-07-18 ENCOUNTER — HOSPITAL ENCOUNTER (EMERGENCY)
Facility: HOSPITAL | Age: 58
Discharge: HOME | End: 2023-07-18
Attending: EMERGENCY MEDICINE | Admitting: EMERGENCY MEDICINE
Payer: COMMERCIAL

## 2023-07-18 VITALS
TEMPERATURE: 97.5 F | RESPIRATION RATE: 16 BRPM | HEIGHT: 70 IN | WEIGHT: 165 LBS | SYSTOLIC BLOOD PRESSURE: 146 MMHG | OXYGEN SATURATION: 100 % | BODY MASS INDEX: 23.62 KG/M2 | HEART RATE: 64 BPM | DIASTOLIC BLOOD PRESSURE: 92 MMHG

## 2023-07-18 DIAGNOSIS — R33.8 ACUTE URINARY RETENTION: Primary | ICD-10-CM

## 2023-07-18 PROBLEM — K31.819 AVM (ARTERIOVENOUS MALFORMATION) OF STOMACH, ACQUIRED: Status: ACTIVE | Noted: 2023-07-18

## 2023-07-18 PROBLEM — K64.8 INTERNAL HEMORRHOIDS: Status: ACTIVE | Noted: 2023-07-18

## 2023-07-18 PROBLEM — K57.90 DIVERTICULOSIS: Status: ACTIVE | Noted: 2023-07-18

## 2023-07-18 PROBLEM — K64.4 EXTERNAL HEMORRHOID: Status: ACTIVE | Noted: 2023-07-18

## 2023-07-18 LAB
BILIRUB UR QL STRIP: NEGATIVE
CLARITY UR: CLEAR
COLOR UR: YELLOW
GLUCOSE UR STRIP-MCNC: NEGATIVE MG/DL
HGB UR QL STRIP.AUTO: NEGATIVE
KETONES UR QL STRIP: NEGATIVE
LEUKOCYTE ESTERASE UR QL STRIP.AUTO: NEGATIVE
NITRITE UR QL STRIP: NEGATIVE
PH UR STRIP.AUTO: 7 [PH] (ref 5–8)
PROT UR QL STRIP: NEGATIVE
SP GR UR STRIP: <=1.005 (ref 1–1.03)
UROBILINOGEN UR QL STRIP: NORMAL

## 2023-07-18 PROCEDURE — 99283 EMERGENCY DEPT VISIT LOW MDM: CPT

## 2023-07-18 PROCEDURE — 81003 URINALYSIS AUTO W/O SCOPE: CPT

## 2023-07-18 PROCEDURE — 51702 INSERT TEMP BLADDER CATH: CPT

## 2023-07-18 NOTE — ED PROVIDER NOTES
EMERGENCY DEPARTMENT ENCOUNTER    Room Number:  18/18  PCP: Jeny Parkinson APRN  Historian: Patient      HPI:  Chief Complaint: Inability to urinate  A complete HPI/ROS/PMH/PSH/SH/FH are unobtainable due to: None  Context: Gorge Newman is a 57 y.o. male who presents to the ED c/o inability to urinate that has been present since awakening from his colonoscopy that was performed earlier today.  He does describe some lower abdominal fullness and discomfort.  He does report some associated nausea but denies vomiting, back pain, fever/chills, chest pain, or shortness of breath.            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    Diverticulosis 07/18/2023     Resolved Ambulatory Problems     Diagnosis Date Noted    No Resolved Ambulatory Problems     Past Medical History:   Diagnosis Date    Erectile dysfunction 8/1/2018         PAST SURGICAL HISTORY  Past Surgical History:   Procedure Laterality Date    APPENDECTOMY  5/10/2010    COLONOSCOPY  2021    COLONOSCOPY N/A 7/18/2023    Procedure: COLONOSCOPY into the cecum;  Surgeon: Ezekiel Gomez MD;  Location: [facility] ENDOSCOPY;  Service: General;  Laterality: N/A;  preop-hiatal hernia;  iron deficiency anemia  postop-hemorrhoids; diverticulosis; abnormal mucosa    ENDOSCOPY N/A 7/18/2023    Procedure: Upper endoscopy with biopsy;  AVM; clip placed;  Surgeon: Ezekiel Gomez MD;  Location: [facility] ENDOSCOPY;  Service: General;  Laterality: N/A;  preop-hiatal hernia; iron deficiency anemia  postop-AVM- Gastric body         FAMILY HISTORY  Family History   Problem Relation Age  "0830- I was contacted by patient with continued complaints of headache s/p lumbar puncture.    Recent history of events- 5/31 lumbar puncture performed.     Patient states she had a small headache on Wednesday evening, which intensified on Thursday am.      Please see EMR communication with IR staff on 6/1/2023 and 6/2/2023.    Headache with dizziness, diaphoresis and nausea continued while laying flat thru the weekend.  Patient confirms that she also continued to drink fluid including caffeine thru the weekend.    Patient would like to speak to a provider and discuss plan of care.    IR team notified of patient status and continued headache and associated symptoms.  IR Provider contacted Neuro Radiology Providers with patient status.    Plan for blood patch today.  Patient instructed to arrive at 12:30 pm to [facility] for procedure.  Directions to [facility] Waiting Room provided to patient.  I have also informed patient to stop eating at time of call (0900 am) in anticipation of procedure.  Patient acknowledged these instructions.    I informed patient that a Neuro-Radiology Provider will discuss plan of care, proposed procedure and answer all patient questions during the procedural prep period.      Patient demanding to speak with provider now.      I explained that I do not work in association with Neuro-Radiology. I again re-iterated that she will be able to have a conversation with provider when she arrives in pre-procedural area.     Patient voicing frustration with plan of care proposed states \"I am working and have to talk with my employer.\"      Patient finally confirms that she understands pre-procedure instructions.    Radha SPEAR  Interventional Radiology RN   [phone]            " of Onset    Hyperlipidemia Father     Vision loss Father          SOCIAL HISTORY  Social History     Socioeconomic History    Marital status:    Tobacco Use    Smoking status: Never     Passive exposure: Never    Smokeless tobacco: Never   Vaping Use    Vaping Use: Never used   Substance and Sexual Activity    Alcohol use: Yes     Alcohol/week: 10.0 standard drinks     Types: 5 Cans of beer, 5 Shots of liquor per week     Comment: weekends    Drug use: Never    Sexual activity: Defer     Partners: Female         ALLERGIES  Patient has no known allergies.        REVIEW OF SYSTEMS  Review of Systems   Constitutional:  Negative for activity change, appetite change and fever.   HENT:  Negative for congestion and sore throat.    Eyes: Negative.    Respiratory:  Negative for cough and shortness of breath.    Cardiovascular:  Negative for chest pain and leg swelling.   Gastrointestinal:  Positive for abdominal pain. Negative for diarrhea and vomiting.   Endocrine: Negative.    Genitourinary:  Positive for difficulty urinating. Negative for decreased urine volume and dysuria.   Musculoskeletal:  Negative for neck pain.   Skin:  Negative for rash and wound.   Allergic/Immunologic: Negative.    Neurological:  Negative for weakness, numbness and headaches.   Hematological: Negative.    Psychiatric/Behavioral: Negative.     All other systems reviewed and are negative.         PHYSICAL EXAM  ED Triage Vitals   Temp Heart Rate Resp BP SpO2   07/18/23 1714 07/18/23 1714 07/18/23 1714 07/18/23 1717 07/18/23 1714   97.5 °F (36.4 °C) 112 16 (!) 140/104 97 %      Temp src Heart Rate Source Patient Position BP Location FiO2 (%)   07/18/23 1714 07/18/23 1714 07/18/23 1717 07/18/23 1717 --   Tympanic Monitor Standing Right arm        Physical Exam  Constitutional:       General: He is not in acute distress.     Appearance: Normal appearance. He is not ill-appearing or toxic-appearing.   HENT:      Head: Normocephalic and  atraumatic.   Eyes:      Extraocular Movements: Extraocular movements intact.      Pupils: Pupils are equal, round, and reactive to light.   Cardiovascular:      Rate and Rhythm: Normal rate and regular rhythm.      Heart sounds: No murmur heard.    No friction rub. No gallop.   Pulmonary:      Effort: Pulmonary effort is normal.      Breath sounds: Normal breath sounds.   Abdominal:      General: Abdomen is flat. There is no distension.      Palpations: Abdomen is soft.      Tenderness: There is abdominal tenderness in the suprapubic area.      Comments: Lower abdominal fullness   Musculoskeletal:         General: No swelling or tenderness. Normal range of motion.      Cervical back: Normal range of motion and neck supple.   Skin:     General: Skin is warm and dry.   Neurological:      General: No focal deficit present.      Mental Status: He is alert and oriented to person, place, and time.      Sensory: No sensory deficit.      Motor: No weakness.   Psychiatric:         Mood and Affect: Mood normal.         Behavior: Behavior normal.         Vital signs and nursing notes reviewed.          LAB RESULTS  No results found for this or any previous visit (from the past 24 hour(s)).      Ordered the above labs and reviewed the results.        RADIOLOGY  No Radiology Exams Resulted Within Past 24 Hours    Ordered the above noted radiological studies. Reviewed by me in PACS.            PROCEDURES  Procedures            MEDICATIONS GIVEN IN ER  Medications - No data to display                MEDICAL DECISION MAKING, PROGRESS, and CONSULTS    All labs have been independently reviewed by me.  All radiology studies have been reviewed by me and I have also reviewed the radiology report.   EKG's independently viewed and interpreted by me.  Discussion below represents my analysis of pertinent findings related to patient's condition, differential diagnosis, treatment plan and final disposition.      Additional sources:  -  Discussed/ obtained information from independent historians: History obtained from the patient himself at bedside.    - External (non-ED) record review: Upon medical records review, the patient was last seen and evaluated as an outpatient by Dr. Gomez, general surgery, today, 7/18/2023, where he underwent a colonoscopy secondary to iron deficiency anemia.    - Chronic or social conditions impacting care: None reported    - Shared decision making: Discharge decision based on shared conversations have between myself as well as the patient and the patient's spouse at bedside.      Orders placed during this visit:  Orders Placed This Encounter   Procedures    Urinalysis With Microscopic If Indicated (No Culture) - Urine, Clean Catch    Insert Indwelling Urinary Catheter             Differential diagnosis includes but is not limited to:    Differential diagnosis includes but is not limited to acute urinary retention due to anesthesia, benign prostatic hypertrophy, acute prostatitis, acute renal failure, or urinary tract infection.      Independent interpretation of labs, radiology studies, and discussions with consultants:    Urinalysis results were independently interpreted by myself with my interpretation showing a normal urinalysis without sign of blood or infection.        ED Course as of 07/20/23 0026   Tue Jul 18, 2023 1942 On reevaluation, the patient reports resolution of symptoms after a Santos catheter has been placed.  He does have a significantly large amount of urine present in the bag.  I did inform him as well as the spouse that this is most likely related to anesthesia from his procedure earlier today.  We will place a leg bag and he will wear the catheter for a couple of days and have it removed as an outpatient.  The patient is in agreement with that plan and all questions have been answered. [BM]      ED Course User Index  [BM] Davidson Lozoya MD             DIAGNOSIS  Final diagnoses:   Acute  urinary retention         DISPOSITION  DISCHARGE    Patient discharged in stable condition.    Reviewed implications of results, diagnosis, meds, responsibility to follow up, warning signs and symptoms of possible worsening, potential complications and reasons to return to ER.    Patient/Family voiced understanding of above instructions.    Discussed plan for discharge, as there is no emergent indication for admission. Patient referred to primary care provider for BP management due to today's BP. Pt/family is agreeable and understands need for follow up and repeat testing.  Pt is aware that discharge does not mean that nothing is wrong but it indicates no emergency is present that requires admission and they must continue care with follow-up as given below or physician of their choice.     FOLLOW-UP  Jeny Parkinson, APRN  [address] [facility] [address]  [phone]    Schedule an appointment as soon as possible for a visit       [facility]  [address] [facility] [address]  [phone]  Schedule an appointment as soon as possible for a visit            Medication List      No changes were made to your prescriptions during this visit.                   Latest Documented Vital Signs:  As of 00:26 EDT  BP- 146/92 HR- 64 Temp- 97.5 °F (36.4 °C) (Tympanic) O2 sat- 100%              --    Please note that portions of this were completed with a voice recognition program.       Note Disclaimer: At [facility], we believe that sharing information builds trust and better relationships. You are receiving this note because you are receiving care at [facility] or recently visited. It is possible you will see health information before a provider has talked with you about it. This kind of information can be easy to misunderstand. To help you fully understand what it means for your health, we urge you to discuss this note with your provider.             Davidson Lozoya MD  07/20/23  0026

## 2023-08-16 DIAGNOSIS — I10 HYPERTENSION, UNSPECIFIED TYPE: ICD-10-CM

## 2023-08-16 RX ORDER — AMLODIPINE BESYLATE 5 MG/1
5 TABLET ORAL DAILY
Qty: 30 TABLET | Refills: 0 | Status: SHIPPED | OUTPATIENT
Start: 2023-08-16

## 2023-09-19 DIAGNOSIS — A04.8 H. PYLORI INFECTION: Primary | ICD-10-CM

## 2023-09-19 RX ORDER — OMEPRAZOLE 40 MG/1
40 CAPSULE, DELAYED RELEASE ORAL DAILY
Qty: 60 CAPSULE | Refills: 0 | OUTPATIENT
Start: 2023-09-19

## 2023-09-19 NOTE — TELEPHONE ENCOUNTER
Called to inform patient that we received refill request for omeprazole and that Dr. Gomez wanted him to follow-up in 2 months. Offered to schedule pt an appointment-pt states he received a large bill after his procedures and that he does not want to schedule another appointment. Pt was not happy about the bill he received. I asked if he contacted the billing department or his insurance company and he states he has not.

## 2023-09-21 DIAGNOSIS — I10 HYPERTENSION, UNSPECIFIED TYPE: ICD-10-CM

## 2023-09-21 RX ORDER — AMLODIPINE BESYLATE 5 MG/1
5 TABLET ORAL DAILY
Qty: 30 TABLET | Refills: 0 | Status: SHIPPED | OUTPATIENT
Start: 2023-09-21

## 2023-10-17 DIAGNOSIS — I10 HYPERTENSION, UNSPECIFIED TYPE: ICD-10-CM

## 2023-10-17 DIAGNOSIS — E78.5 HYPERLIPIDEMIA, UNSPECIFIED HYPERLIPIDEMIA TYPE: ICD-10-CM

## 2023-10-17 RX ORDER — ATORVASTATIN CALCIUM 20 MG/1
20 TABLET, FILM COATED ORAL NIGHTLY
Qty: 90 TABLET | Refills: 0 | Status: SHIPPED | OUTPATIENT
Start: 2023-10-17

## 2023-10-17 RX ORDER — LISINOPRIL AND HYDROCHLOROTHIAZIDE 25; 20 MG/1; MG/1
1 TABLET ORAL DAILY
Qty: 90 TABLET | Refills: 0 | Status: SHIPPED | OUTPATIENT
Start: 2023-10-17

## 2023-10-26 DIAGNOSIS — I10 HYPERTENSION, UNSPECIFIED TYPE: ICD-10-CM

## 2023-10-26 RX ORDER — AMLODIPINE BESYLATE 5 MG/1
5 TABLET ORAL DAILY
Qty: 90 TABLET | OUTPATIENT
Start: 2023-10-26

## 2023-10-26 RX ORDER — AMLODIPINE BESYLATE 5 MG/1
5 TABLET ORAL DAILY
Qty: 30 TABLET | Refills: 0 | Status: SHIPPED | OUTPATIENT
Start: 2023-10-26

## 2023-11-28 ENCOUNTER — OFFICE VISIT (OUTPATIENT)
Dept: FAMILY MEDICINE CLINIC | Facility: CLINIC | Age: 58
End: 2023-11-28
Payer: COMMERCIAL

## 2023-11-28 VITALS
HEART RATE: 84 BPM | DIASTOLIC BLOOD PRESSURE: 84 MMHG | OXYGEN SATURATION: 96 % | WEIGHT: 161 LBS | SYSTOLIC BLOOD PRESSURE: 126 MMHG | HEIGHT: 70 IN | BODY MASS INDEX: 23.05 KG/M2

## 2023-11-28 DIAGNOSIS — J34.3 HYPERTROPHY OF NASAL TURBINATES: ICD-10-CM

## 2023-11-28 DIAGNOSIS — R06.02 SOB (SHORTNESS OF BREATH) ON EXERTION: ICD-10-CM

## 2023-11-28 DIAGNOSIS — R06.83 LOUD SNORING: ICD-10-CM

## 2023-11-28 DIAGNOSIS — I10 HYPERTENSION, UNSPECIFIED TYPE: Primary | ICD-10-CM

## 2023-11-28 DIAGNOSIS — D50.9 IRON DEFICIENCY ANEMIA, UNSPECIFIED IRON DEFICIENCY ANEMIA TYPE: ICD-10-CM

## 2023-11-28 DIAGNOSIS — E78.5 HYPERLIPIDEMIA, UNSPECIFIED HYPERLIPIDEMIA TYPE: ICD-10-CM

## 2023-11-28 DIAGNOSIS — N52.9 ERECTILE DYSFUNCTION, UNSPECIFIED ERECTILE DYSFUNCTION TYPE: ICD-10-CM

## 2023-11-28 PROBLEM — Z00.00 ENCOUNTER FOR ANNUAL PHYSICAL EXAM: Status: RESOLVED | Noted: 2023-04-17 | Resolved: 2023-11-28

## 2023-11-28 PROBLEM — K44.9 HIATAL HERNIA: Status: ACTIVE | Noted: 2023-11-28

## 2023-11-28 PROCEDURE — 99214 OFFICE O/P EST MOD 30 MIN: CPT | Performed by: NURSE PRACTITIONER

## 2023-11-28 RX ORDER — AMLODIPINE BESYLATE 5 MG/1
5 TABLET ORAL DAILY
Qty: 90 TABLET | Refills: 0 | Status: SHIPPED | OUTPATIENT
Start: 2023-11-28 | End: 2023-12-04

## 2023-11-28 RX ORDER — SILDENAFIL 100 MG/1
100 TABLET, FILM COATED ORAL DAILY PRN
Qty: 30 TABLET | Refills: 1 | Status: SHIPPED | OUTPATIENT
Start: 2023-11-28

## 2023-11-28 RX ORDER — ATORVASTATIN CALCIUM 20 MG/1
20 TABLET, FILM COATED ORAL NIGHTLY
Qty: 90 TABLET | Refills: 0 | Status: SHIPPED | OUTPATIENT
Start: 2023-11-28

## 2023-11-28 RX ORDER — LISINOPRIL AND HYDROCHLOROTHIAZIDE 25; 20 MG/1; MG/1
1 TABLET ORAL DAILY
Qty: 90 TABLET | Refills: 0 | Status: SHIPPED | OUTPATIENT
Start: 2023-11-28

## 2023-11-28 NOTE — PROGRESS NOTES
Chief Complaint  Hypertension (FOLLOW UP )    Subjective          Gorge Newman presents to [facility] for  History of Present Illness:    He is here to follow-up on Hypertension, Hyperlipidemia, Erectile Dysfunction with complaint of SOB on exertion and loud breathing:    HTN - he is currently taking Amlodipine 5mg daily, and Lisinopril 20mg-HCTZ 25mg daily.  He denies chest pain, heart palpitations and leg swelling.    HLD - he stopped taking Atorvastatin about 2 months ago.  Discussed ASCVD risk of 11.8% and recommended he re-start Atorvastatin and he agreed.  He denies side effects to medication.    ED - he reports difficulty getting and maintaining erection.  He reports sildenafil helps improve erections.    SOB - he reports intermittent feeling of SOB on exertion when walking and when climbing steps.  He denies SOB when sitting in chair.  He denies SOB when laying down.  He admits this has been an intermittent problem for the past year.  He denies SOB today.    Loud Breathing/Snoring - His wife reports he breaths loud when he is just laying down, or when he is sleeping.  He reports these symptoms have been going on for about 3-4 months.  He admits having difficulty breathing through nose with mouth closed.  He denies upper respiratory complaints. He reports using Flonase nasal spray once per week for the past year.    Iron Deficiency Anemia - Discussed low hemoglobin and recommended we re-check hemoglobin at next visit.  Patient stated he did colonoscopy and doctor told him everything was normal.  Dr. Gomez's notes show patient had EGD/Colonoscopy and EGD showed H.Pylori gastritis and patient was prescribed antibiotics and PPI back in July, 2023.    Patient upset because he did EGD/Colonoscopy procedure this year after having colonoscopy last year then he was sent home from colonoscopy and he could not urinate and wound up going to Emergency Room and is now stuck with a large  "bill.  I told him I have no control over what his out of pocket costs are for medical care and that he will have to ask what his cost will be prior to seeing any specialists, doing tests, or having any other procedures and he agreed.     He denies any other complaints today.  He is requesting refills of above medications today.      Review of Systems   Constitutional:  Negative for chills, fatigue and fever.   HENT:  Negative for congestion, ear discharge, ear pain, postnasal drip, rhinorrhea, sinus pressure, sinus pain, sneezing and sore throat.    Eyes:  Negative for pain, discharge and itching.   Respiratory:  Positive for shortness of breath.         Intermittent SOB with walking/climbing stairs; loud breathing when laying down, snoring   Cardiovascular:  Negative for chest pain, palpitations and leg swelling.   Gastrointestinal:  Negative for vomiting.   Musculoskeletal:  Negative for myalgias.   Neurological:  Negative for dizziness and light-headedness.       The 10-year ASCVD risk score (Alistair CHRISTIE, et al., 2019) is: 11.8%    Values used to calculate the score:      Age: 58 years      Sex: Male      Is Non- : Yes      Diabetic: No      Tobacco smoker: No      Systolic Blood Pressure: 126 mmHg      Is BP treated: Yes      HDL Cholesterol: 37 mg/dL      Total Cholesterol: 184 mg/dL        Objective   Vital Signs:   /84 (BP Location: Left arm, Patient Position: Sitting, Cuff Size: Adult)   Pulse 84   Ht 177.8 cm (70\")   Wt 73 kg (161 lb)   SpO2 96%   BMI 23.10 kg/m²     BMI is within normal parameters. No other follow-up for BMI required.     Physical Exam  Vitals and nursing note reviewed.   Constitutional:       Appearance: Normal appearance.   HENT:      Head: Normocephalic and atraumatic.      Salivary Glands: Right salivary gland is not diffusely enlarged or tender. Left salivary gland is not diffusely enlarged or tender.      Right Ear: Tympanic membrane normal. No middle " ear effusion. No mastoid tenderness. Tympanic membrane is not injected, erythematous or bulging.      Left Ear: Tympanic membrane normal.  No middle ear effusion. No mastoid tenderness. Tympanic membrane is not injected, erythematous or bulging.      Nose: No nasal deformity, septal deviation, congestion or rhinorrhea.      Right Nostril: No foreign body.      Left Nostril: No foreign body.      Right Turbinates: Enlarged.      Left Turbinates: Enlarged.      Right Sinus: No maxillary sinus tenderness or frontal sinus tenderness.      Left Sinus: No maxillary sinus tenderness or frontal sinus tenderness.      Mouth/Throat:      Mouth: Mucous membranes are moist. No oral lesions.      Tongue: No lesions. Tongue does not deviate from midline.      Palate: No mass and lesions.      Pharynx: Oropharynx is clear. Uvula midline. No pharyngeal swelling or oropharyngeal exudate.      Tonsils: No tonsillar exudate or tonsillar abscesses.   Eyes:      General:         Right eye: No discharge.         Left eye: No discharge.      Conjunctiva/sclera: Conjunctivae normal.   Cardiovascular:      Rate and Rhythm: Normal rate and regular rhythm.      Heart sounds: Normal heart sounds. No murmur heard.  Pulmonary:      Effort: Pulmonary effort is normal. No respiratory distress.      Breath sounds: Normal breath sounds. No wheezing, rhonchi or rales.   Musculoskeletal:      Cervical back: Neck supple. No tenderness.   Lymphadenopathy:      Cervical: No cervical adenopathy.   Skin:     General: Skin is warm and dry.   Neurological:      Mental Status: He is alert.        Result Review :   The following data was reviewed by: ASHLIE Schmitz on 11/28/2023:  CMP          5/1/2023    08:13 6/13/2023    15:58   CMP   Glucose 89  128    BUN 15  20    Creatinine 1.30  1.13    EGFR  76.8    Sodium 138  140    Potassium 4.0  3.5    Chloride 100  102    Calcium 10.4  9.2    Total Protein 7.5     Total Protein  7.6    Albumin 4.4  4.1     Globulin 3.1     Globulin  3.5    Total Bilirubin 0.5  <0.2    Alkaline Phosphatase 68  96    AST (SGOT) 20  23    ALT (SGPT) 17  19    Albumin/Globulin Ratio  1.2    BUN/Creatinine Ratio 11.5  17.7    Anion Gap  8.0      CBC w/diff          5/1/2023    08:13 6/13/2023    15:58   CBC w/Diff   WBC 3.56  6.08    RBC 5.40  4.97    Hemoglobin 12.1  10.9    Hematocrit 38.7  34.8    MCV 71.7  70.0    MCH 22.4  21.9    MCHC 31.3  31.3    RDW 17.4  16.4    Platelets 341  444      Lipid Panel          5/1/2023    08:13   Lipid Panel   Total Cholesterol 184    Triglycerides 109    HDL Cholesterol 37    VLDL Cholesterol 20    LDL Cholesterol  127      Data reviewed : Consultant notes EDGARDO Turcios 7/18/23             Assessment and Plan    Diagnoses and all orders for this visit:    1. Hypertension, unspecified type (Primary)  Assessment & Plan:  Hypertension is controlled.  Decrease table salt and foods high in salt.  Weight loss.  Increase activity daily.  Continue Amlodipine 5mg daily, and Lisinopril-HCTZ 20-25mg daily.  Labs will be checked next visit.  Blood pressure will be reassessed in 2 months.      Orders:  -     lisinopril-hydrochlorothiazide (PRINZIDE,ZESTORETIC) 20-25 MG per tablet; Take 1 tablet by mouth Daily. NEED APPOINTMENT FOR FUTURE REFILL.  Dispense: 90 tablet; Refill: 0  -     amLODIPine (NORVASC) 5 MG tablet; Take 1 tablet by mouth Daily. NEED APPOINTMENT FOR FUTURE REFILL.  Dispense: 90 tablet; Refill: 0    2. Hyperlipidemia, unspecified hyperlipidemia type  Assessment & Plan:  Lipids elevated on prior lab from 5/2023.  Patient stopped taking Atorvastatin 20mg nightly.  Discussed ASCVD risk 11.8% and patient agreed to take Atorvastatin.  Encouraged lifestyle changes.  Continue current treatment plan.  Will re-assess Lipids next visit.      Orders:  -     atorvastatin (LIPITOR) 20 MG tablet; Take 1 tablet by mouth Every Night. NEED APPOINTMENT FOR FUTURE REFILL.  Dispense: 90 tablet; Refill: 0    3.  SOB (shortness of breath) on exertion  Assessment & Plan:  C/O intermittent SOB when walking/climbing stairs.  Discussed getting X-ray of chest and patient agreed.  Advised patient that he would have to go (walk-in) to [facility] Radiology Department to get Chest X-ray done and that nobody will be calling him to schedule the Chest X-ray and he understood and agreed.  Discussed getting stress ECHO and patient declined test due to cost.  Order:  Chest X-ray      Orders:  -     XR Chest PA & Lateral; Future    4. Hypertrophy of nasal turbinates  Assessment & Plan:  Difficulty breathing through nose when mouth is closed.  Referral ENT for evaluation and treatment and patient agreed.    Orders:  -     Ambulatory Referral to ENT (Otolaryngology)    5. Loud snoring    6. Iron deficiency anemia, unspecified iron deficiency anemia type  Comments:  Completed EGD/Colonoscopy w/result=H.Pylorid gastritis, treated w/Antibiotics & PPI, otherwise normal. Repeat colonoscopy 10 years.  Will check labs next visit    7. Erectile dysfunction, unspecified erectile dysfunction type  Assessment & Plan:  Controlled on Sildenafil 100mg daily PRN.  Will continue to monitor.    Orders:  -     sildenafil (VIAGRA) 100 MG tablet; Take 1 tablet by mouth Daily As Needed for Erectile Dysfunction. NEED APPOINTMENT FOR FUTURE REFILL.  Dispense: 30 tablet; Refill: 1        Follow Up   Return in about 2 months (around 1/28/2024) for Next scheduled follow up SOB.  Patient was given instructions and counseling regarding his condition or for health maintenance advice. Please see specific information pulled into the AVS if appropriate.

## 2023-11-29 NOTE — ASSESSMENT & PLAN NOTE
Lipids elevated on prior lab from 5/2023.  Patient stopped taking Atorvastatin 20mg nightly.  Discussed ASCVD risk 11.8% and patient agreed to take Atorvastatin.  Encouraged lifestyle changes.  Continue current treatment plan.  Will re-assess Lipids next visit.

## 2023-11-29 NOTE — ASSESSMENT & PLAN NOTE
C/O intermittent SOB when walking/climbing stairs.  Discussed getting X-ray of chest and patient agreed.  Advised patient that he would have to go (walk-in) to [facility] Radiology Department to get Chest X-ray done and that nobody will be calling him to schedule the Chest X-ray and he understood and agreed.  Discussed getting stress ECHO and patient declined test due to cost.  Order:  Chest X-ray

## 2023-11-29 NOTE — ASSESSMENT & PLAN NOTE
Hypertension is controlled.  Decrease table salt and foods high in salt.  Weight loss.  Increase activity daily.  Continue Amlodipine 5mg daily, and Lisinopril-HCTZ 20-25mg daily.  Labs will be checked next visit.  Blood pressure will be reassessed in 2 months.

## 2023-11-29 NOTE — ASSESSMENT & PLAN NOTE
Difficulty breathing through nose when mouth is closed.  Referral ENT for evaluation and treatment and patient agreed.

## 2023-12-03 DIAGNOSIS — I10 HYPERTENSION, UNSPECIFIED TYPE: ICD-10-CM

## 2023-12-04 RX ORDER — AMLODIPINE BESYLATE 5 MG/1
5 TABLET ORAL DAILY
Qty: 30 TABLET | Refills: 3 | Status: SHIPPED | OUTPATIENT
Start: 2023-12-04

## 2024-01-10 ENCOUNTER — TELEPHONE (OUTPATIENT)
Dept: FAMILY MEDICINE CLINIC | Facility: CLINIC | Age: 59
End: 2024-01-10
Payer: COMMERCIAL

## 2024-01-10 NOTE — TELEPHONE ENCOUNTER
Caller: Gorge Newman    Relationship: Self    Best call back number: [phone]     What medication are you requesting: N\A    What are your current symptoms: CONGESTION, CHILLS, COUGH- NEGATIVE COVID TEST     How long have you been experiencing symptoms: 2 DAYS     Have you had these symptoms before:    [x] Yes  [] No    Have you been treated for these symptoms before:   [x] Yes  [] No    If a prescription is needed, what is your preferred pharmacy and phone number: [facility] [id number] [place] [id number] [address] AT [facility] & ([address]) - [phone] [facility] [phone]      Additional notes: PATIENT WOULD LIKE FOR A MEDICATION TO BE CALLED IN    PLEASE CALL AND ADVISE

## 2024-01-11 NOTE — TELEPHONE ENCOUNTER
Called and advised patient to take the following medication:    Take Tylenol (OTC) as directed, as needed, for pain, body aches, sore throat, headache & fever.  Increase fluids (water) intake.  Take Mucinex-DM 1200mg (OTC) every 12 hours with 8oz water and increase water throughout the day for congestion and cough.    Follow-up PRN.

## 2024-01-30 ENCOUNTER — TELEPHONE (OUTPATIENT)
Dept: FAMILY MEDICINE CLINIC | Facility: CLINIC | Age: 59
End: 2024-01-30

## 2024-01-30 NOTE — TELEPHONE ENCOUNTER
Caller: NewmanGorge    Relationship: Self    Best call back number: [phone]    What medication are you requesting: ANYTHING TO HELP    What are your current symptoms: COLD SORE IN MOUTH,BURNING     How long have you been experiencing symptoms: 3 DAYS    Have you had these symptoms before:    [] Yes  [] No    Have you been treated for these symptoms before:   [] Yes  [] No    If a prescription is needed, what is your preferred pharmacy and phone number: [facility] [id number] AT [facility] & ([address])  [phone] [facility] [phone] FX

## 2024-01-31 NOTE — TELEPHONE ENCOUNTER
PATIENT INFORMED.--Call patient and have him gargle with warm salt water 3-4 times daily.  Or, he can make an appointment to come in to be evaluated.  Thanks.

## 2024-01-31 NOTE — TELEPHONE ENCOUNTER
Call patient and have him gargle with warm salt water 3-4 times daily.  Or, he can make an appointment to come in to be evaluated.  Thanks.

## 2024-01-31 NOTE — TELEPHONE ENCOUNTER
Hub staff attempted to follow warm transfer process and was unsuccessful     Caller: Gorge Newman    Relationship to patient: Self    Best call back number: [phone]    Patient is needing: HUB UNABLE TO RELAY MESSAGE TO PATIENT, PLEASE REACH OUT TO PATIENT AND ADVISE.     PATIENT WANTED TO ADD THAT THIS IS THE SECOND TIME THERE HAS BEEN A DELAY IN GETTING HIM MEDICATION OR TREATMENT.

## 2024-02-01 DIAGNOSIS — I10 HYPERTENSION, UNSPECIFIED TYPE: ICD-10-CM

## 2024-02-01 RX ORDER — LISINOPRIL AND HYDROCHLOROTHIAZIDE 25; 20 MG/1; MG/1
1 TABLET ORAL DAILY
Qty: 90 TABLET | Refills: 0 | Status: SHIPPED | OUTPATIENT
Start: 2024-02-01

## 2024-02-27 ENCOUNTER — OFFICE VISIT (OUTPATIENT)
Dept: FAMILY MEDICINE CLINIC | Facility: CLINIC | Age: 59
End: 2024-02-27
Payer: COMMERCIAL

## 2024-02-27 VITALS
DIASTOLIC BLOOD PRESSURE: 80 MMHG | HEIGHT: 70 IN | SYSTOLIC BLOOD PRESSURE: 120 MMHG | OXYGEN SATURATION: 98 % | BODY MASS INDEX: 23.05 KG/M2 | HEART RATE: 68 BPM | WEIGHT: 161 LBS

## 2024-02-27 DIAGNOSIS — K02.9 DENTAL CARIES: ICD-10-CM

## 2024-02-27 DIAGNOSIS — K08.9 POOR DENTITION: ICD-10-CM

## 2024-02-27 DIAGNOSIS — K14.0 TONGUE ULCERATION: ICD-10-CM

## 2024-02-27 DIAGNOSIS — R59.0 SUBMANDIBULAR LYMPHADENOPATHY: Primary | ICD-10-CM

## 2024-02-27 PROCEDURE — 99213 OFFICE O/P EST LOW 20 MIN: CPT | Performed by: NURSE PRACTITIONER

## 2024-02-27 RX ORDER — AMOXICILLIN AND CLAVULANATE POTASSIUM 875; 125 MG/1; MG/1
1 TABLET, FILM COATED ORAL 2 TIMES DAILY
Qty: 14 TABLET | Refills: 0 | Status: SHIPPED | OUTPATIENT
Start: 2024-02-27

## 2024-02-27 NOTE — PROGRESS NOTES
"Sunitha Newman presents to [facility] for Mouth Lesions (In mouth ) and Mass (On chin and neck )   History of Present Illness    He is here with complaint of lump under right side of chin that has been there for about 1 week.  He also reports having a sore in his mouth under left side of tongue.  He reports having broken teeth that need to be fixed.  He has a dentist appointment today at 11:30am, at [facility].    Review of Systems       Objective   Vital Signs:   /80 (BP Location: Left arm, Patient Position: Sitting, Cuff Size: Adult)   Pulse 68   Ht 177.8 cm (70\")   Wt 73 kg (161 lb)   SpO2 98%   BMI 23.10 kg/m²     BMI is within normal parameters. No other follow-up for BMI required.    Physical Exam  Vitals and nursing note reviewed.   Constitutional:       General: He is not in acute distress.     Appearance: Normal appearance.   HENT:      Head: Normocephalic and atraumatic.      Salivary Glands: Right salivary gland is diffusely enlarged. Right salivary gland is not tender. Left salivary gland is not diffusely enlarged or tender.        Right Ear: Tympanic membrane normal.      Left Ear: Tympanic membrane normal.      Nose: Nose normal.      Right Sinus: No maxillary sinus tenderness or frontal sinus tenderness.      Left Sinus: No maxillary sinus tenderness or frontal sinus tenderness.      Mouth/Throat:      Mouth: Mucous membranes are moist. No oral lesions.      Dentition: Abnormal dentition. Dental caries present.      Tongue: Lesions present. Tongue does not deviate from midline.      Palate: No mass and lesions.      Pharynx: Oropharynx is clear. Uvula midline.      Comments: 1mm ulceration left anterior-inferior tongue.  Eyes:      Conjunctiva/sclera: Conjunctivae normal.   Cardiovascular:      Rate and Rhythm: Normal rate and regular rhythm.      Heart sounds: Normal heart sounds. No murmur heard.  Pulmonary:      Effort: " Pulmonary effort is normal. No respiratory distress.      Breath sounds: Normal breath sounds. No wheezing.   Musculoskeletal:      Cervical back: No tenderness.   Lymphadenopathy:      Head:      Right side of head: Submandibular adenopathy present.      Cervical: No cervical adenopathy.   Skin:     General: Skin is warm and dry.      Findings: No erythema, lesion or rash.   Neurological:      Mental Status: He is alert.        Result Review :                 Assessment and Plan    Diagnoses and all orders for this visit:    1. Submandibular lymphadenopathy (Primary)  -     amoxicillin-clavulanate (AUGMENTIN) 875-125 MG per tablet; Take 1 tablet by mouth 2 (Two) Times a Day.  Dispense: 14 tablet; Refill: 0    2. Tongue ulceration  Comments:  Rinse with warm salt water 4 times per day.  Encouraged good oral hygeine.    3. Dental caries  Comments:  He reports having broken teeth that need to be fixed.  He has a dentist appointment today at 11:30am, at [facility].    4. Poor dentition  Comments:  He reports having broken teeth that need to be fixed.  He has a dentist appointment today at 11:30am, at [facility].        Follow Up   Return in about 1 month (around 3/27/2024) for Recheck - Lymphadenopathy.  Patient was given instructions and counseling regarding his condition or for health maintenance advice. Please see specific information pulled into the AVS if appropriate.

## 2024-03-11 DIAGNOSIS — R59.0 SUBMANDIBULAR LYMPHADENOPATHY: ICD-10-CM

## 2024-03-11 RX ORDER — AMOXICILLIN AND CLAVULANATE POTASSIUM 875; 125 MG/1; MG/1
1 TABLET, FILM COATED ORAL 2 TIMES DAILY
Qty: 14 TABLET | Refills: 0 | OUTPATIENT
Start: 2024-03-11

## 2024-03-11 NOTE — TELEPHONE ENCOUNTER
Caller: Dileep Newmanrm    Relationship: Self    Best call back number: [phone]    Requested Prescriptions:   Requested Prescriptions     Pending Prescriptions Disp Refills    amoxicillin-clavulanate (AUGMENTIN) 875-125 MG per tablet 14 tablet 0     Sig: Take 1 tablet by mouth 2 (Two) Times a Day.        [id number] [place], [place] - [address] AT [facility] [phone] [facility] [phone]      Last office visit with prescribing clinician: 2/27/2024   Last telemedicine visit with prescribing clinician: Visit date not found   Next office visit with prescribing clinician: 3/27/2024     Additional details provided by patient: PATIENT IS STILL NOT FEELING WELL.SORE THROAT    Lashawn Jarrell Rep   03/11/24 08:52 EDT

## 2024-03-27 ENCOUNTER — OFFICE VISIT (OUTPATIENT)
Dept: FAMILY MEDICINE CLINIC | Facility: CLINIC | Age: 59
End: 2024-03-27
Payer: COMMERCIAL

## 2024-03-27 VITALS
SYSTOLIC BLOOD PRESSURE: 132 MMHG | HEART RATE: 55 BPM | DIASTOLIC BLOOD PRESSURE: 80 MMHG | WEIGHT: 161.4 LBS | BODY MASS INDEX: 23.11 KG/M2 | HEIGHT: 70 IN | OXYGEN SATURATION: 100 %

## 2024-03-27 DIAGNOSIS — K14.0 TONGUE ULCERATION: ICD-10-CM

## 2024-03-27 DIAGNOSIS — R59.0 SUBMANDIBULAR LYMPHADENOPATHY: ICD-10-CM

## 2024-03-27 DIAGNOSIS — I10 HYPERTENSION, UNSPECIFIED TYPE: Primary | ICD-10-CM

## 2024-03-27 DIAGNOSIS — E78.5 HYPERLIPIDEMIA, UNSPECIFIED HYPERLIPIDEMIA TYPE: ICD-10-CM

## 2024-03-27 DIAGNOSIS — J34.3 HYPERTROPHY OF NASAL TURBINATES: ICD-10-CM

## 2024-03-27 DIAGNOSIS — R00.1 BRADYCARDIA: ICD-10-CM

## 2024-03-27 NOTE — PROGRESS NOTES
"Sunitha Newman presents to [facility] for Hypertension (Follow up)   History of Present Illness    He is here to follow-up on uncontrolled hypertension:    Hypertension - he has been taking his BP medication every day and last took it this morning about 8:15am.  He denies SOB, CP, heart palpitations and syncope.    Lesion on left lateral tongue - he has been rinsing his mouth with warm salt water and lesion went away for a while and just came back.   He eats spicy Cajun foods on a regular basis every day.  He has been to Dentist and had some dental work done recently.  His dentist gave him a mouth wash to use.  Encouraged patient to use mouthwash as directed and to stop/minimize spicy foods.  He has another appointment with Dentist on April 6, 2024.    Right submandibular mass - I gave patient Augmentin on 2/27/24 and right submandibular lymphadenopathy/mass has not changed.  Patient has had some dental work done recently.  Patient is followed by  for enlarged nasal turbinates, next Appointment is April 8, 2024.  Patient stated that Dr. Mendoza did US and said may do a CT scan on him.  I do not see ENT Office note in chart.  I will get recent ENT office note and see if Dr. Mendoza is ordering US/CT Neck relating to mass.      Review of Systems       Objective   Vital Signs:   /80 (BP Location: Right arm, Patient Position: Sitting, Cuff Size: Adult) Comment: re-checked  Pulse 55   Ht 177.8 cm (70\")   Wt 73.2 kg (161 lb 6.4 oz)   SpO2 100%   BMI 23.16 kg/m²     BMI is within normal parameters. No other follow-up for BMI required.    Physical Exam  Vitals and nursing note reviewed.   Constitutional:       General: He is not in acute distress.     Appearance: Normal appearance. He is not ill-appearing.   HENT:      Head: Normocephalic and atraumatic.      Mouth/Throat:      Dentition: Abnormal dentition. Dental caries present. No dental tenderness or " gum lesions.      Tongue: Lesions present.      Palate: No mass and lesions.      Pharynx: Oropharynx is clear. Uvula midline.      Tonsils: No tonsillar exudate or tonsillar abscesses.     Eyes:      Conjunctiva/sclera: Conjunctivae normal.   Neck:     Cardiovascular:      Rate and Rhythm: Regular rhythm. Bradycardia present.      Heart sounds: Normal heart sounds. No murmur heard.  Pulmonary:      Effort: Pulmonary effort is normal. No respiratory distress.      Breath sounds: Normal breath sounds. No wheezing.   Musculoskeletal:      Cervical back: Neck supple. No tenderness.      Right lower leg: No edema.      Left lower leg: No edema.   Lymphadenopathy:      Cervical: No cervical adenopathy.   Skin:     General: Skin is warm and dry.      Findings: No erythema.   Neurological:      Mental Status: He is alert.        Result Review :                 Assessment and Plan    Diagnoses and all orders for this visit:    1. Hypertension, unspecified type (Primary)  Assessment & Plan:  Hypertension is stable.  Decrease table salt and foods high in salt.  Weight loss.  Increase activity daily.  Continue Amlodipine 5mg daily & Lisinopril-HCTZ 20-25mg daily.  Blood pressure will be re-assessed in 3 months.    Orders:  -     CBC & Differential  -     Comprehensive Metabolic Panel    2. Hyperlipidemia, unspecified hyperlipidemia type  -     Lipid Panel    3. Bradycardia  Assessment & Plan:  Asymptomatic; HR 55, O2Sat 100%.  Will continue to monitor.      4. Submandibular lymphadenopathy  Assessment & Plan:  Patient reports followed by Dr. Mendoza, ENT.  Requested BILL Robertson, call ENT office and request that they send me the most recent copy of Dr. Mendoza's office note on this patient so I can confirm whether or not Dr. Mendoza is following patient for this submandibular lymphadenopathy.  Awaiting copy of office note.    Orders:  -     CBC & Differential    5. Tongue ulceration  Assessment & Plan:  Resolved after warm salt  water rinse and then came back.  Stop/decrease eating spicy Cajun seasoning.  Continue Oral Dental Rinse as prescribed by dentist.  Follow-up with dentist per recommendation.  Follow-up if si/sy worsen or fail to improve.        6. Hypertrophy of nasal turbinates  Assessment & Plan:  Followed by Dr. Mendoza, ENT.      Other orders  -     Manual Differential        Follow Up   Return in about 3 months (around 6/27/2024) for Next scheduled follow up - HTN, HLD.  Patient was given instructions and counseling regarding his condition or for health maintenance advice. Please see specific information pulled into the AVS if appropriate.

## 2024-03-28 ENCOUNTER — TELEPHONE (OUTPATIENT)
Dept: FAMILY MEDICINE CLINIC | Facility: CLINIC | Age: 59
End: 2024-03-28
Payer: COMMERCIAL

## 2024-03-28 LAB
ALBUMIN SERPL-MCNC: 4.3 G/DL (ref 3.5–5.2)
ALBUMIN/GLOB SERPL: 1.4 G/DL
ALP SERPL-CCNC: 126 U/L (ref 39–117)
ALT SERPL-CCNC: 21 U/L (ref 1–41)
AST SERPL-CCNC: 26 U/L (ref 1–40)
BILIRUB SERPL-MCNC: 0.4 MG/DL (ref 0–1.2)
BUN SERPL-MCNC: 13 MG/DL (ref 6–20)
BUN/CREAT SERPL: 11.7 (ref 7–25)
CALCIUM SERPL-MCNC: 10 MG/DL (ref 8.6–10.5)
CHLORIDE SERPL-SCNC: 103 MMOL/L (ref 98–107)
CHOLEST SERPL-MCNC: 181 MG/DL (ref 0–200)
CO2 SERPL-SCNC: 31.5 MMOL/L (ref 22–29)
CREAT SERPL-MCNC: 1.11 MG/DL (ref 0.76–1.27)
DIFFERENTIAL COMMENT: NORMAL
EGFRCR SERPLBLD CKD-EPI 2021: 77.9 ML/MIN/1.73
EOSINOPHIL # BLD MANUAL: 0.12 10*3/MM3 (ref 0–0.4)
EOSINOPHIL NFR BLD MANUAL: 3.1 % (ref 0.3–6.2)
ERYTHROCYTE [DISTWIDTH] IN BLOOD BY AUTOMATED COUNT: 16.3 % (ref 12.3–15.4)
GLOBULIN SER CALC-MCNC: 3 GM/DL
GLUCOSE SERPL-MCNC: 71 MG/DL (ref 65–99)
HCT VFR BLD AUTO: 37.6 % (ref 37.5–51)
HDLC SERPL-MCNC: 40 MG/DL (ref 40–60)
HGB BLD-MCNC: 11.7 G/DL (ref 13–17.7)
LDLC SERPL CALC-MCNC: 126 MG/DL (ref 0–100)
LYMPHOCYTES # BLD MANUAL: 1.1 10*3/MM3 (ref 0.7–3.1)
LYMPHOCYTES NFR BLD MANUAL: 28.9 % (ref 19.6–45.3)
MCH RBC QN AUTO: 22 PG (ref 26.6–33)
MCHC RBC AUTO-ENTMCNC: 31.1 G/DL (ref 31.5–35.7)
MCV RBC AUTO: 70.7 FL (ref 79–97)
MONOCYTES # BLD MANUAL: 0.31 10*3/MM3 (ref 0.1–0.9)
MONOCYTES NFR BLD MANUAL: 8.2 % (ref 5–12)
NEUTROPHILS # BLD MANUAL: 2.28 10*3/MM3 (ref 1.7–7)
NEUTROPHILS NFR BLD MANUAL: 59.8 % (ref 42.7–76)
NRBC BLD AUTO-RTO: 0 /100 WBC (ref 0–0.2)
PLATELET # BLD AUTO: 376 10*3/MM3 (ref 140–450)
PLATELET BLD QL SMEAR: NORMAL
POTASSIUM SERPL-SCNC: 4.2 MMOL/L (ref 3.5–5.2)
PROT SERPL-MCNC: 7.3 G/DL (ref 6–8.5)
RBC # BLD AUTO: 5.32 10*6/MM3 (ref 4.14–5.8)
RBC MORPH BLD: NORMAL
SODIUM SERPL-SCNC: 140 MMOL/L (ref 136–145)
TRIGL SERPL-MCNC: 82 MG/DL (ref 0–150)
VLDLC SERPL CALC-MCNC: 15 MG/DL (ref 5–40)
WBC # BLD AUTO: 3.81 10*3/MM3 (ref 3.4–10.8)

## 2024-03-28 NOTE — TELEPHONE ENCOUNTER
Caller: Gorge Newman    Relationship to patient: Self    Best call back number: [phone]     Patient is needing: AN UPDATE ON GETTING HIS CT SCHEDULED     PATIENT STATES HE WAS IN THE OFFICE YESTERDAY

## 2024-03-28 NOTE — TELEPHONE ENCOUNTER
Called patient about one referral he asked   About ct scan I dont see order can you enter order please

## 2024-03-29 DIAGNOSIS — R71.8 ANISOCYTOSIS: Primary | ICD-10-CM

## 2024-03-29 DIAGNOSIS — R71.8 MICROCYTIC RED BLOOD CELLS: ICD-10-CM

## 2024-03-29 DIAGNOSIS — R71.8 POLYCHROMASIA: ICD-10-CM

## 2024-03-29 NOTE — TELEPHONE ENCOUNTER
"I spoke to patient during visit on Wednesday, 3/27/24, that patient was referred to ENT back in 11/2023 for abnormal nasal finding.  Patient reports that he had recent visit with Dr. Mendoza, ENT, regarding nasal abnormality.  Patient told me that ENT was going to order a CT of his neck.  I told patient I will request office note from ENT to see if he is addressing his enlarged lymph node before I duplicate workup.  Requested Monesha get last Note from Dr. Mendoza's (ENT) office and I am still waiting on this.  The ENT Note that you scanned in today 3/28/24 stated \"unable to scan office note in because provider has not completed it yet.\".     Can you please try to get the completed office note from Dr. Mendoza so I know if he is ordering a CT scan, which is what the patient told me,  regarding patient's lymph node.  I will also try to call and speak to Dr. Mendoza Friday 3/29/24.  Thanks.  "

## 2024-03-29 NOTE — TELEPHONE ENCOUNTER
Called and spoke to patient indicating that I received Office Note from Beni Acuna, ENT this morning.  Advised patient that Dr. Acuna has ordered a CT Neck w/contrast to be done relating to right submandibular mass and put him on Augmentin.  Encouraged patient to take all antibiotics as directed.  Patient agreed and understood.

## 2024-03-29 NOTE — TELEPHONE ENCOUNTER
There are some notes in that came in look in the media section and see if that is what you need if you need more let me know and I will get additional info

## 2024-03-30 PROBLEM — R59.0 SUBMANDIBULAR LYMPHADENOPATHY: Status: ACTIVE | Noted: 2024-03-30

## 2024-03-30 PROBLEM — K14.0 TONGUE ULCERATION: Status: ACTIVE | Noted: 2024-03-30

## 2024-03-30 NOTE — ASSESSMENT & PLAN NOTE
Hypertension is stable.  Decrease table salt and foods high in salt.  Weight loss.  Increase activity daily.  Continue Amlodipine 5mg daily & Lisinopril-HCTZ 20-25mg daily.  Blood pressure will be re-assessed in 3 months.

## 2024-03-30 NOTE — ASSESSMENT & PLAN NOTE
Patient reports followed by Dr. Mendoza, ENT.  Requested BILL Robertson, call ENT office and request that they send me the most recent copy of Dr. Mendoza's office note on this patient so I can confirm whether or not Dr. Mendoza is following patient for this submandibular lymphadenopathy.  Awaiting copy of office note.

## 2024-03-30 NOTE — ASSESSMENT & PLAN NOTE
Resolved after warm salt water rinse and then came back.  Stop/decrease eating spicy Cajun seasoning.  Continue Oral Dental Rinse as prescribed by dentist.  Follow-up with dentist per recommendation.  Follow-up if si/sy worsen or fail to improve.

## 2024-10-21 ENCOUNTER — OFFICE VISIT (OUTPATIENT)
Dept: FAMILY MEDICINE CLINIC | Facility: CLINIC | Age: 59
End: 2024-10-21
Payer: COMMERCIAL

## 2024-10-21 VITALS
WEIGHT: 162 LBS | OXYGEN SATURATION: 100 % | BODY MASS INDEX: 23.19 KG/M2 | HEIGHT: 70 IN | SYSTOLIC BLOOD PRESSURE: 150 MMHG | HEART RATE: 74 BPM | DIASTOLIC BLOOD PRESSURE: 86 MMHG

## 2024-10-21 DIAGNOSIS — N52.9 ERECTILE DYSFUNCTION, UNSPECIFIED ERECTILE DYSFUNCTION TYPE: Chronic | ICD-10-CM

## 2024-10-21 DIAGNOSIS — Z23 NEED FOR INFLUENZA VACCINATION: ICD-10-CM

## 2024-10-21 DIAGNOSIS — Z91.199 NON COMPLIANCE WITH MEDICAL TREATMENT: ICD-10-CM

## 2024-10-21 DIAGNOSIS — D50.9 IRON DEFICIENCY ANEMIA, UNSPECIFIED IRON DEFICIENCY ANEMIA TYPE: Chronic | ICD-10-CM

## 2024-10-21 DIAGNOSIS — I10 PRIMARY HYPERTENSION: Chronic | ICD-10-CM

## 2024-10-21 DIAGNOSIS — Z00.00 ENCOUNTER FOR ANNUAL PHYSICAL EXAM: Primary | ICD-10-CM

## 2024-10-21 DIAGNOSIS — R59.0 SUBMANDIBULAR LYMPHADENOPATHY: ICD-10-CM

## 2024-10-21 DIAGNOSIS — Z00.00 HEALTH CARE MAINTENANCE: ICD-10-CM

## 2024-10-21 DIAGNOSIS — E78.41 ELEVATED LIPOPROTEIN(A): Chronic | ICD-10-CM

## 2024-10-21 DIAGNOSIS — J30.2 SEASONAL ALLERGIC RHINITIS, UNSPECIFIED TRIGGER: ICD-10-CM

## 2024-10-21 DIAGNOSIS — K14.8 TONGUE LESION: ICD-10-CM

## 2024-10-21 PROBLEM — G89.29 CHRONIC LEFT SHOULDER PAIN: Status: RESOLVED | Noted: 2023-04-28 | Resolved: 2024-10-21

## 2024-10-21 PROBLEM — M25.512 CHRONIC LEFT SHOULDER PAIN: Status: RESOLVED | Noted: 2023-04-28 | Resolved: 2024-10-21

## 2024-10-21 PROCEDURE — 99396 PREV VISIT EST AGE 40-64: CPT | Performed by: NURSE PRACTITIONER

## 2024-10-21 PROCEDURE — 99214 OFFICE O/P EST MOD 30 MIN: CPT | Performed by: NURSE PRACTITIONER

## 2024-10-21 PROCEDURE — 90656 IIV3 VACC NO PRSV 0.5 ML IM: CPT | Performed by: NURSE PRACTITIONER

## 2024-10-21 PROCEDURE — 90471 IMMUNIZATION ADMIN: CPT | Performed by: NURSE PRACTITIONER

## 2024-10-21 RX ORDER — LISINOPRIL AND HYDROCHLOROTHIAZIDE 20; 25 MG/1; MG/1
1 TABLET ORAL DAILY
Qty: 90 TABLET | Refills: 0 | Status: SHIPPED | OUTPATIENT
Start: 2024-10-21

## 2024-10-21 RX ORDER — LORATADINE 10 MG/1
10 TABLET ORAL DAILY
Qty: 90 TABLET | Refills: 1 | Status: SHIPPED | OUTPATIENT
Start: 2024-10-21

## 2024-10-21 RX ORDER — ATORVASTATIN CALCIUM 20 MG/1
20 TABLET, FILM COATED ORAL NIGHTLY
Qty: 90 TABLET | Refills: 0 | Status: SHIPPED | OUTPATIENT
Start: 2024-10-21

## 2024-10-21 RX ORDER — AMLODIPINE BESYLATE 5 MG/1
5 TABLET ORAL DAILY
Qty: 30 TABLET | Refills: 3 | Status: SHIPPED | OUTPATIENT
Start: 2024-10-21

## 2024-10-21 NOTE — PROGRESS NOTES
Chief Complaint  Annual checkup.     HISTORY    Gorge Newman is a 59 y.o. male who presents to the office today as a  an established patient for their annual preventative exam.      No hospitalization(s) within the last year.     Current exercise regimen:  None.    Status of chronic medical conditions:     Hypertension - uncontrolled on Amlodipine 5mg daily and Lisinopril-HCTZ 20-25mg daily.  He has been taking his BP medication every day up until 3 days ago because he ran out of medication.   He denies SOB, CP, heart palpitations and syncope.    Hyperlipidemia - improved on Atorvastatin 20mg nightly.  Denies myalgia.    Erectile Dysfunction - controlled on Sildenafil 100mg daily PRN.  Patient admits medication helps with his symptoms.  Denies complaints.    Lesion Left Lateral Tongue - he reports tongue lesion has resolved.    Right Submandibular  - slightly improved.  Patient saw Dr. Acuna who treated him with Augmentin due to chronic sinusitis.  Dr. Acuna is going to see if antibiotic improves his symptoms.  He also discussed ordering a CT scan to further evaluate right submandibular mass.  He has history of having 1 molar pulled around Jan/Feb, 2024.  He has not followed-up with Dentist.   Patient is unsure when he last followed up with ENT.  He took Augmentin as directed by ENT specialist and thinks it helped a little.    Anemia - slightly improved.  Followed by Dr. Gomez.  He was also referred to Hematology but referral note indicated that patient declined to see Hematology due to abnormal CBC.  Patient declined Hematology appointment for abnormal CBC.  Dr. Gomez recommended he follow-up in 2 months back in 7/2024 after patient finished treatment for H.Pylori for re-evaluation, but patient did not follow-up with Dr. Gomez.  Patient admits finishing treatment for H.Pylori.  Encouraged patient to call Dr. Gomez's office to schedule appointment to follow-up on H.Pylori and he agreed.    Any other concerns  regarding their health today: None.    Review of Systems   Constitutional: Negative.    HENT:  Positive for congestion and dental problem. Negative for ear discharge, ear pain, facial swelling, hearing loss, mouth sores, nosebleeds, postnasal drip, rhinorrhea, sinus pressure, sneezing, sore throat, swollen glands, tinnitus, trouble swallowing and voice change.         Nasal congestion - uses Flonase daily and followed by ENT;  He was told he has cavity in right upper tooth, he said he is going to call [facility] for appointment.   Eyes: Negative.    Respiratory: Negative.     Cardiovascular: Negative.    Gastrointestinal: Negative.    Endocrine: Negative.    Genitourinary: Negative.    Musculoskeletal:  Positive for arthralgias and back pain. Negative for gait problem, neck pain and neck stiffness.        Intermittent lower back pain and bilateral knee pain, denies complaint today.   Skin: Negative.    Allergic/Immunologic: Negative.    Neurological: Negative.    Hematological: Negative.    Psychiatric/Behavioral: Negative.          Health Maintenance Summary            Overdue - ZOSTER VACCINE (1 of 2) Never done      No completion history exists for this topic.              Overdue - ANNUAL PHYSICAL (Yearly) Overdue since 4/17/2024 04/17/2023  Registry Metric: Last Annual Physical              Overdue - COVID-19 Vaccine (4 - 2023-24 season) Overdue since 9/1/2024 12/04/2021  Imm Admin: COVID-19 (PFIZER) Purple Cap Monovalent    04/15/2021  Imm Admin: COVID-19 (PFIZER) Purple Cap Monovalent    03/19/2021  Imm Admin: COVID-19 (PFIZER) Purple Cap Monovalent              LIPID PANEL (Yearly) Next due on 10/21/2025      10/21/2024  Lipid Panel    03/27/2024  Lipid Panel    05/01/2023  Lipid Panel              TDAP/TD VACCINES (2 - Td or Tdap) Next due on 1/20/2033 01/20/2023  Imm Admin: Tdap              COLORECTAL CANCER SCREENING (COLONOSCOPY - Every 10 Years) Next due on 7/18/2033       07/18/2023  Surgical Procedure: COLONOSCOPY    07/18/2023  COLONOSCOPY    06/16/2023  Fecal Occult Blood component of Occult Blood X 1, Stool - Stool, Per Rectum    06/16/2023  Fecal Occult Blood component of Occult Blood X 1, Stool - Stool, Per Rectum    06/14/2023  Fecal Occult Blood component of Occult Blood X 1, Stool - Stool, Per Rectum    Only the first 5 history entries have been loaded, but more history exists.              HEPATITIS C SCREENING  Completed      05/01/2023  Hep C Virus Ab component of Hepatitis C Antibody              INFLUENZA VACCINE  Completed      10/21/2024  Imm Admin: Fluzone  >6mos    10/13/2022  Imm Admin: FluLaval/Fluzone >6mos    09/30/2021  Imm Admin: FluLaval/Fluzone >6mos    01/02/2020  Imm Admin: FluLaval/Fluzone >6mos              Pneumococcal Vaccine 0-64 (Series Information) Aged Out      No completion history exists for this topic.                     No Known Allergies     Outpatient Medications Marked as Taking for the 10/21/24 encounter (Office Visit) with Jeny Parkinson APRN   Medication Sig Dispense Refill    amLODIPine (NORVASC) 5 MG tablet Take 1 tablet by mouth Daily. 30 tablet 3    amoxicillin-clavulanate (AUGMENTIN) 875-125 MG per tablet Take 1 tablet by mouth 2 (Two) Times a Day. 14 tablet 0    atorvastatin (LIPITOR) 20 MG tablet Take 1 tablet by mouth Every Night. NEED APPOINTMENT FOR FUTURE REFILL. 90 tablet 0    lisinopril-hydrochlorothiazide (PRINZIDE,ZESTORETIC) 20-25 MG per tablet Take 1 tablet by mouth Daily. 90 tablet 0    loratadine (CLARITIN) 10 MG tablet Take 1 tablet by mouth Daily. 90 tablet 1    sildenafil (VIAGRA) 100 MG tablet Take 1 tablet by mouth Daily As Needed for Erectile Dysfunction. NEED APPOINTMENT FOR FUTURE REFILL. 30 tablet 1    [DISCONTINUED] amLODIPine (NORVASC) 5 MG tablet TAKE 1 TABLET BY MOUTH DAILY 30 tablet 3    [DISCONTINUED] amoxicillin-clavulanate (AUGMENTIN) 875-125 MG per tablet Take 1 tablet by mouth 2 (Two) Times a  "Day. 14 tablet 0    [DISCONTINUED] atorvastatin (LIPITOR) 20 MG tablet Take 1 tablet by mouth Every Night. NEED APPOINTMENT FOR FUTURE REFILL. 90 tablet 0    [DISCONTINUED] lisinopril-hydrochlorothiazide (PRINZIDE,ZESTORETIC) 20-25 MG per tablet TAKE 1 TABLET BY MOUTH DAILY 90 tablet 0    [DISCONTINUED] loratadine (CLARITIN) 10 MG tablet Take 1 tablet by mouth Daily. 90 tablet 1       Past Medical History:   Diagnosis Date    Erectile dysfunction 8/1/2018    Hypertension 7/1/2015     Past Surgical History:   Procedure Laterality Date    APPENDECTOMY  5/10/2010    COLONOSCOPY  2021    COLONOSCOPY N/A 7/18/2023    Procedure: COLONOSCOPY into the cecum;  Surgeon: Ezekiel Gomez MD;  Location:  [facility];  Service: General;  Laterality: N/A;  preop-hiatal hernia;  iron deficiency anemia  postop-hemorrhoids; diverticulosis; abnormal mucosa    ENDOSCOPY N/A 7/18/2023    Procedure: Upper endoscopy with biopsy;  AVM; clip placed;  Surgeon: Ezekiel Gomez MD;  Location:  [facility];  Service: General;  Laterality: N/A;  preop-hiatal hernia; iron deficiency anemia  postop-AVM- Gastric body     Family History   Problem Relation Age of Onset    Hyperlipidemia Father     Vision loss Father     reports that he has never smoked. He has never been exposed to tobacco smoke. He has never used smokeless tobacco. He reports current alcohol use of about 2.0 standard drinks of alcohol per week. He reports that he does not use drugs.    Immunization History   Administered Date(s) Administered    COVID-19 (PFIZER) Purple Cap Monovalent 03/19/2021, 04/15/2021, 12/04/2021    Fluzone  >6mos 10/21/2024    Fluzone (or Fluarix & Flulaval for VFC) >6mos 01/02/2020, 09/30/2021, 10/13/2022    Tdap 01/20/2023        OBJECTIVE    Vital Signs:   /86   Pulse 74   Ht 177.8 cm (70\")   Wt 73.5 kg (162 lb)   SpO2 100%   BMI 23.24 kg/m²     Physical Exam  Vitals and nursing note reviewed.   Constitutional:       " General: He is not in acute distress.     Appearance: Normal appearance. He is normal weight. He is not ill-appearing.   HENT:      Head: Normocephalic and atraumatic.      Jaw: No pain on movement.      Salivary Glands: Right salivary gland is not diffusely enlarged or tender. Left salivary gland is not diffusely enlarged or tender.      Right Ear: Tympanic membrane, ear canal and external ear normal. No tenderness. No middle ear effusion. There is no impacted cerumen. Tympanic membrane is not bulging.      Left Ear: Tympanic membrane, ear canal and external ear normal. No tenderness.  No middle ear effusion. There is no impacted cerumen. Tympanic membrane is not bulging.      Nose: Nose normal. No congestion or rhinorrhea.      Right Sinus: No maxillary sinus tenderness or frontal sinus tenderness.      Left Sinus: No maxillary sinus tenderness or frontal sinus tenderness.      Mouth/Throat:      Mouth: Mucous membranes are moist. No oral lesions.      Dentition: Abnormal dentition. Dental caries present.      Tongue: No lesions.      Palate: No lesions.      Pharynx: No oropharyngeal exudate or posterior oropharyngeal erythema.   Eyes:      General: No scleral icterus.        Right eye: No discharge.         Left eye: No discharge.      Extraocular Movements: Extraocular movements intact.      Conjunctiva/sclera: Conjunctivae normal.      Pupils: Pupils are equal, round, and reactive to light.   Neck:      Vascular: No carotid bruit.   Cardiovascular:      Rate and Rhythm: Normal rate and regular rhythm.      Pulses: Normal pulses.      Heart sounds: Normal heart sounds. No murmur heard.     No gallop.   Pulmonary:      Effort: Pulmonary effort is normal. No respiratory distress.      Breath sounds: Normal breath sounds. No wheezing.   Abdominal:      General: Bowel sounds are normal. There is no distension.      Palpations: Abdomen is soft. There is no mass.      Tenderness: There is no abdominal tenderness.  There is no right CVA tenderness, left CVA tenderness, guarding or rebound.      Hernia: There is no hernia in the umbilical area or ventral area.   Genitourinary:     Comments: Deferred.  Musculoskeletal:         General: No swelling or tenderness. Normal range of motion.      Cervical back: Normal range of motion and neck supple. No rigidity or tenderness.      Right lower leg: No edema.      Left lower leg: No edema.   Lymphadenopathy:      Head:      Right side of head: Submandibular adenopathy present.      Left side of head: No submandibular adenopathy.      Cervical: No cervical adenopathy.   Skin:     General: Skin is warm.      Capillary Refill: Capillary refill takes less than 2 seconds.      Findings: No lesion or rash.   Neurological:      General: No focal deficit present.      Mental Status: He is alert.      Cranial Nerves: No cranial nerve deficit.      Sensory: No sensory deficit.      Motor: No weakness.      Coordination: Coordination normal.      Gait: Gait normal.   Psychiatric:         Mood and Affect: Mood normal.         Behavior: Behavior normal.          The following data was reviewed by: ASHLIE Schmitz on 10/21/2024:    CBC & Differential (03/27/2024 10:35)  Comprehensive Metabolic Panel (03/27/2024 10:35)  Lipid Panel (03/27/2024 10:35)  Manual Differential (03/27/2024 10:35)         The 10-year ASCVD risk score (Alistair CHRISTIE, et al., 2019) is: 16%    Values used to calculate the score:      Age: 59 years      Sex: Male      Is Non- : Yes      Diabetic: No      Tobacco smoker: No      Systolic Blood Pressure: 150 mmHg      Is BP treated: Yes      HDL Cholesterol: 43 mg/dL      Total Cholesterol: 180 mg/dL           ASSESSMENT & PLAN   Diagnoses and all orders for this visit:    1. Encounter for annual physical exam (Primary)  -     CBC & Differential  -     Comprehensive Metabolic Panel  -     Lipid Panel    2. Primary hypertension  Assessment &  Plan:  Hypertension is uncontrolled.  Decrease table salt and foods high in salt.  Weight loss.  Increase activity daily.  Continue Amlodipine 5mg daily & Lisinopril-HCTZ 20-25mg daily.  Blood pressure will be re-assessed in 2 weeks.    Orders:  -     CBC & Differential  -     Comprehensive Metabolic Panel  -     amLODIPine (NORVASC) 5 MG tablet; Take 1 tablet by mouth Daily.  Dispense: 30 tablet; Refill: 3  -     lisinopril-hydrochlorothiazide (PRINZIDE,ZESTORETIC) 20-25 MG per tablet; Take 1 tablet by mouth Daily.  Dispense: 90 tablet; Refill: 0    3. Elevated lipoprotein(a)  Assessment & Plan:  Lipids slightly elevated on prior lab from 3/27/24.  Taking Atorvastatin 20mg nightly.  Discussed ASCVD risk 16% and importance of taking Atorvastatin.  Encouraged lifestyle changes.  Continue current treatment plan.  Will re-assess Lipids today.    Orders:  -     Comprehensive Metabolic Panel  -     Lipid Panel  -     atorvastatin (LIPITOR) 20 MG tablet; Take 1 tablet by mouth Every Night. NEED APPOINTMENT FOR FUTURE REFILL.  Dispense: 90 tablet; Refill: 0    4. Iron deficiency anemia, unspecified iron deficiency anemia type  Assessment & Plan:  Abnormal; Hgb 11.7, Hct 37.6 on lab from 3/27/24.  Followed by Dr. Gomez, GI.  Dr. Gomez recommended he follow-up in 2 months back in 7/2024 after patient finished treatment for H.Pylori for re-evaluation, but patient did not followed up.  Patient admits finishing treatment for H.Pylori.  Encouraged patient to call Dr. Gomez's office to schedule appointment to follow-up on H.Pylori and he agreed.  Provided patient with name and phone number for Dr. Gomez.    He was also referred to Hematology but referral note indicated that patient declined to see Hematology due to abnormal CBC.  Provided name and phone number for Dr. Fitzgerald, Hematology and advised patient to call and schedule appointment.    Will re-assess anemia today.    Orders:  -     CBC & Differential  -     Comprehensive  Metabolic Panel    5. Erectile dysfunction, unspecified erectile dysfunction type  Assessment & Plan:  Controlled on Sildenafil 100mg daily PRN.  Continue current treatment plan.  Will continue to monitor.    Orders:  -     Comprehensive Metabolic Panel    6. Submandibular lymphadenopathy  Assessment & Plan:  Patient reports followed by Dr. Acuna, ENT.   Patient admits taking Augmentin as directed by ENT specialist and thinks it helped a little.  Dr. Acuna discussed ordering CT scan to further evaluate submandibular mass.  He has history of having 1 molar pulled around Jan/Feb, 2024.  During previous visit, advised patient to follow-up with his dentist but has not done so.  Strongly encouraged patient to follow-up with Dentist per ENT specialist recommendation.  Provided Rx: Augmentin BID.  Encouraged patient to follow-up with ENT specialist for continued evaluation and treatment.    Orders:  -     CBC & Differential  -     amoxicillin-clavulanate (AUGMENTIN) 875-125 MG per tablet; Take 1 tablet by mouth 2 (Two) Times a Day.  Dispense: 14 tablet; Refill: 0    7. Tongue lesion  Assessment & Plan:  Resolved.      8. Seasonal allergic rhinitis, unspecified trigger  -     loratadine (CLARITIN) 10 MG tablet; Take 1 tablet by mouth Daily.  Dispense: 90 tablet; Refill: 1    9. Non compliance with medical treatment  Assessment & Plan:  Enlarged right submandibular lymphadenopathy.  He has history of having 1 molar pulled around Jan/Feb, 2024.  During previous visit, advised patient to follow-up with his dentist but has not done so.  Strongly encouraged patient to follow-up with Dentist per ENT specialist recommendation.  Referred to Hematology but referral note indicated that patient declined to see Hematology due to abnormal CBC.  Strongly encouraged patient to follow-up with all specialists.      10. Need for influenza vaccination  Comments:  Patient agreed to get Influenza vaccine today.  Orders:  -     Fluzone  >6mos    11. Health care maintenance  Comments:  Use sunscreen w/sun expsure.  Working smoke/Carbon Monoxide detectors in home.  Wear seatbelt.  Annual Vision/Dental exam.  Increase activity 30min/day x 5 days    Other orders  -     Iron Profile  -     Reticulocytes  -     Ferritin  -     Written Authorization        Annual Preventative Health Examination  -Age and sex appropriate physical exam performed and documented. Updated past medical, family, social and surgical histories as well as allergies and care team list. Addressed care gaps listed in the medical record.  -Encouraged seat belt use for every car ride for patient and all occupants. Encouraged sunscreen use to reduce risk of skin cancer for any days with sun exposure over 20 minutes.  Discussed the importance of smoke and carbon monoxide detectors in the home.   -Encouraged annual dental and vision exams as part of their overall health.  -Encouraged minimum of 30 minutes or more of exercise at a brisk walk or higher 5 days per week combined with a well-balanced diet.   -Immunizations reviewed and updated in EMR. Influenza, Shingrix, and COVID19 recommended.  Patient agreed to get Influenza vaccine today.  -Lipid screening:   Lipid Panel          3/27/2024    10:35 10/21/2024    12:07   Lipid Panel   Total Cholesterol 181  180    Triglycerides 82  91    HDL Cholesterol 40  43    VLDL Cholesterol 15  17    LDL Cholesterol  126  120     Patient is already on statin therapy.   -Aspirin for primary or secondary prevention: ASCVD risk calculate and aspirin for primary prevention is not indicated.  -Depression and Anxiety screening: Patient denies symptom of anxiety or depression.  -Diabetes screening: Screening not indicated at this time.   -Tobacco use screening: Patient denies cigarette use. Tobacco counseling was was not indicated.  -Alcohol use screening: Patient reports drinking 2 drinks per week.. Alcohol abuse counseling was was offered and declined by  the patient.  -Illicit drug screening: Patient does not use illicit drugs.   -Abdominal aortic aneurysm screening: AAA screening is not indicated as patient is less than 65 years of age.  -Hypertension screening: Patient with known diagnosis of hypertension and is receiving treatment.  -HIV screening: Screening not indicated, not in a high-risk group.  -Hepatitis C virus screening:  Patient has already completed Hepatitis C screening. Negative screening on file.   -Syphilis screening: Syphilis screening not indicated.  -Hepatitis B virus screening: Screening not indicated, not in a high-risk group.  -Colon cancer screening: Patient is already up to date on their colon cancer screening with colonoscopy and screening is indicated again in 7/18/2033.  -Lung cancer screening: Patient has never smoked.  -Prostate cancer screening:  Last PSA test was 0.809 on 5/1/2023 and re-screening is indicated every 2 years.      Follow up in 1 year for annual physical exam.    Patient/family had no further questions at this time and verbalized understanding of the plan discussed today.     This document has been electronically signed by ASHLIE Schmitz  October 28, 2024 23:02 EDT

## 2024-10-22 LAB
ALBUMIN SERPL-MCNC: 4.2 G/DL (ref 3.5–5.2)
ALBUMIN/GLOB SERPL: 1.3 G/DL
ALP SERPL-CCNC: 80 U/L (ref 39–117)
ALT SERPL-CCNC: 13 U/L (ref 1–41)
AST SERPL-CCNC: 23 U/L (ref 1–40)
BASOPHILS # BLD AUTO: 0.01 10*3/MM3 (ref 0–0.2)
BASOPHILS NFR BLD AUTO: 0.3 % (ref 0–1.5)
BILIRUB SERPL-MCNC: 0.5 MG/DL (ref 0–1.2)
BUN SERPL-MCNC: 12 MG/DL (ref 6–20)
BUN/CREAT SERPL: 11.1 (ref 7–25)
CALCIUM SERPL-MCNC: 9.9 MG/DL (ref 8.6–10.5)
CHLORIDE SERPL-SCNC: 102 MMOL/L (ref 98–107)
CHOLEST SERPL-MCNC: 180 MG/DL (ref 0–200)
CO2 SERPL-SCNC: 30.5 MMOL/L (ref 22–29)
CREAT SERPL-MCNC: 1.08 MG/DL (ref 0.76–1.27)
EGFRCR SERPLBLD CKD-EPI 2021: 80 ML/MIN/1.73
EOSINOPHIL # BLD AUTO: 0.03 10*3/MM3 (ref 0–0.4)
EOSINOPHIL NFR BLD AUTO: 0.8 % (ref 0.3–6.2)
ERYTHROCYTE [DISTWIDTH] IN BLOOD BY AUTOMATED COUNT: 16.3 % (ref 12.3–15.4)
GLOBULIN SER CALC-MCNC: 3.3 GM/DL
GLUCOSE SERPL-MCNC: 87 MG/DL (ref 65–99)
HCT VFR BLD AUTO: 40.9 % (ref 37.5–51)
HDLC SERPL-MCNC: 43 MG/DL (ref 40–60)
HGB BLD-MCNC: 12.7 G/DL (ref 13–17.7)
IMM GRANULOCYTES # BLD AUTO: 0.01 10*3/MM3 (ref 0–0.05)
IMM GRANULOCYTES NFR BLD AUTO: 0.3 % (ref 0–0.5)
LDLC SERPL CALC-MCNC: 120 MG/DL (ref 0–100)
LYMPHOCYTES # BLD AUTO: 1.49 10*3/MM3 (ref 0.7–3.1)
LYMPHOCYTES NFR BLD AUTO: 39.8 % (ref 19.6–45.3)
MCH RBC QN AUTO: 22.2 PG (ref 26.6–33)
MCHC RBC AUTO-ENTMCNC: 31.1 G/DL (ref 31.5–35.7)
MCV RBC AUTO: 71.4 FL (ref 79–97)
MONOCYTES # BLD AUTO: 0.32 10*3/MM3 (ref 0.1–0.9)
MONOCYTES NFR BLD AUTO: 8.6 % (ref 5–12)
NEUTROPHILS # BLD AUTO: 1.88 10*3/MM3 (ref 1.7–7)
NEUTROPHILS NFR BLD AUTO: 50.2 % (ref 42.7–76)
PLATELET # BLD AUTO: 328 10*3/MM3 (ref 140–450)
POTASSIUM SERPL-SCNC: 4.4 MMOL/L (ref 3.5–5.2)
PROT SERPL-MCNC: 7.5 G/DL (ref 6–8.5)
RBC # BLD AUTO: 5.73 10*6/MM3 (ref 4.14–5.8)
SODIUM SERPL-SCNC: 139 MMOL/L (ref 136–145)
TRIGL SERPL-MCNC: 91 MG/DL (ref 0–150)
VLDLC SERPL CALC-MCNC: 17 MG/DL (ref 5–40)
WBC # BLD AUTO: 3.74 10*3/MM3 (ref 3.4–10.8)

## 2024-10-23 DIAGNOSIS — D50.9 IRON DEFICIENCY ANEMIA, UNSPECIFIED IRON DEFICIENCY ANEMIA TYPE: Primary | ICD-10-CM

## 2024-10-24 LAB
FERRITIN SERPL-MCNC: 82 NG/ML (ref 30–400)
IRON SATN MFR SERPL: 36 % (ref 20–50)
IRON SERPL-MCNC: 140 MCG/DL (ref 59–158)
RETICS/RBC NFR AUTO: 0.89 % (ref 0.7–1.9)
TIBC SERPL-MCNC: 392 MCG/DL
UIBC SERPL-MCNC: 252 MCG/DL (ref 112–346)
WRITTEN AUTHORIZATION: NORMAL

## 2024-10-28 PROBLEM — Z91.199 NON COMPLIANCE WITH MEDICAL TREATMENT: Status: ACTIVE | Noted: 2024-10-28

## 2024-10-28 PROBLEM — K14.8 TONGUE LESION: Status: ACTIVE | Noted: 2024-03-30

## 2024-10-29 NOTE — ASSESSMENT & PLAN NOTE
Enlarged right submandibular lymphadenopathy.  He has history of having 1 molar pulled around Jan/Feb, 2024.  During previous visit, advised patient to follow-up with his dentist but has not done so.  Strongly encouraged patient to follow-up with Dentist per ENT specialist recommendation.  Referred to Hematology but referral note indicated that patient declined to see Hematology due to abnormal CBC.  Strongly encouraged patient to follow-up with all specialists.

## 2024-10-29 NOTE — ASSESSMENT & PLAN NOTE
Controlled on Sildenafil 100mg daily PRN.  Continue current treatment plan.  Will continue to monitor.

## 2024-10-29 NOTE — ASSESSMENT & PLAN NOTE
Hypertension is uncontrolled.  Decrease table salt and foods high in salt.  Weight loss.  Increase activity daily.  Continue Amlodipine 5mg daily & Lisinopril-HCTZ 20-25mg daily.  Blood pressure will be re-assessed in 2 weeks.

## 2024-10-29 NOTE — ASSESSMENT & PLAN NOTE
Patient reports followed by Dr. Acuna, ENT.   Patient admits taking Augmentin as directed by ENT specialist and thinks it helped a little.  Dr. Acuna discussed ordering CT scan to further evaluate submandibular mass.  He has history of having 1 molar pulled around Jan/Feb, 2024.  During previous visit, advised patient to follow-up with his dentist but has not done so.  Strongly encouraged patient to follow-up with Dentist per ENT specialist recommendation.  Provided Rx: Augmentin BID.  Encouraged patient to follow-up with ENT specialist for continued evaluation and treatment.

## 2024-10-29 NOTE — ASSESSMENT & PLAN NOTE
Abnormal; Hgb 11.7, Hct 37.6 on lab from 3/27/24.  Followed by Dr. Gomez, GI.  Dr. Gomez recommended he follow-up in 2 months back in 7/2024 after patient finished treatment for H.Pylori for re-evaluation, but patient did not followed up.  Patient admits finishing treatment for H.Pylori.  Encouraged patient to call Dr. Gomez's office to schedule appointment to follow-up on H.Pylori and he agreed.  Provided patient with name and phone number for Dr. Gomez.    He was also referred to Hematology but referral note indicated that patient declined to see Hematology due to abnormal CBC.  Provided name and phone number for Dr. Fitzgerald, Hematology and advised patient to call and schedule appointment.    Will re-assess anemia today.

## 2024-10-29 NOTE — ASSESSMENT & PLAN NOTE
Lipids slightly elevated on prior lab from 3/27/24.  Taking Atorvastatin 20mg nightly.  Discussed ASCVD risk 16% and importance of taking Atorvastatin.  Encouraged lifestyle changes.  Continue current treatment plan.  Will re-assess Lipids today.

## 2024-12-13 ENCOUNTER — OFFICE VISIT (OUTPATIENT)
Dept: FAMILY MEDICINE CLINIC | Facility: CLINIC | Age: 59
End: 2024-12-13
Payer: COMMERCIAL

## 2024-12-13 VITALS
SYSTOLIC BLOOD PRESSURE: 148 MMHG | DIASTOLIC BLOOD PRESSURE: 88 MMHG | OXYGEN SATURATION: 96 % | WEIGHT: 161.6 LBS | BODY MASS INDEX: 23.13 KG/M2 | HEIGHT: 70 IN | HEART RATE: 72 BPM

## 2024-12-13 DIAGNOSIS — I10 PRIMARY HYPERTENSION: Primary | Chronic | ICD-10-CM

## 2024-12-13 DIAGNOSIS — N52.9 ERECTILE DYSFUNCTION, UNSPECIFIED ERECTILE DYSFUNCTION TYPE: Chronic | ICD-10-CM

## 2024-12-13 DIAGNOSIS — E78.41 ELEVATED LIPOPROTEIN(A): Chronic | ICD-10-CM

## 2024-12-13 PROCEDURE — 99214 OFFICE O/P EST MOD 30 MIN: CPT | Performed by: NURSE PRACTITIONER

## 2024-12-13 RX ORDER — LISINOPRIL AND HYDROCHLOROTHIAZIDE 20; 25 MG/1; MG/1
1 TABLET ORAL DAILY
Qty: 90 TABLET | Refills: 1 | Status: SHIPPED | OUTPATIENT
Start: 2024-12-13

## 2024-12-13 RX ORDER — ATORVASTATIN CALCIUM 20 MG/1
20 TABLET, FILM COATED ORAL NIGHTLY
Qty: 90 TABLET | Refills: 1 | Status: SHIPPED | OUTPATIENT
Start: 2024-12-13

## 2024-12-13 RX ORDER — AMLODIPINE BESYLATE 5 MG/1
5 TABLET ORAL DAILY
Qty: 90 TABLET | Refills: 1 | Status: SHIPPED | OUTPATIENT
Start: 2024-12-13

## 2024-12-13 RX ORDER — LORATADINE 10 MG/1
10 TABLET ORAL DAILY
Qty: 90 TABLET | Refills: 1 | Status: SHIPPED | OUTPATIENT
Start: 2024-12-13

## 2024-12-13 RX ORDER — SILDENAFIL 100 MG/1
100 TABLET, FILM COATED ORAL DAILY PRN
Qty: 30 TABLET | Refills: 2 | Status: SHIPPED | OUTPATIENT
Start: 2024-12-13

## 2024-12-13 NOTE — ASSESSMENT & PLAN NOTE
Lipid abnormalities are uncontrolled on Atorvastatin 20mg nightly.  Encouraged lifestyle changes.  Will re-assess lipids next visit.    Orders:    atorvastatin (LIPITOR) 20 MG tablet; Take 1 tablet by mouth Every Night. NEED APPOINTMENT FOR FUTURE REFILL.

## 2024-12-13 NOTE — ASSESSMENT & PLAN NOTE
Hypertension is stable.  Decrease table salt and foods high in salt.  Weight loss.  Increase activity daily.  Continue Amlodipine 5mg daily and Lisinopril-HCTZ 20-25mg daily.  Blood pressure will be re-assessed in 3 months.    Orders:    amLODIPine (NORVASC) 5 MG tablet; Take 1 tablet by mouth Daily.    lisinopril-hydrochlorothiazide (PRINZIDE,ZESTORETIC) 20-25 MG per tablet; Take 1 tablet by mouth Daily.

## 2024-12-13 NOTE — ASSESSMENT & PLAN NOTE
Controlled on sildenafil 100mg daily PRN.  Continue current treatment plan.  Will continue monitor.    Orders:    sildenafil (VIAGRA) 100 MG tablet; Take 1 tablet by mouth Daily As Needed for Erectile Dysfunction.

## 2024-12-13 NOTE — PROGRESS NOTES
"Chief Complaint  Hypertension (2 month follow up )    Subjective        HPI   History of Present Illness      Gorge presents to [facility] for follow-up on Hypertension, Hyperlipidemia and ED:    Hypertension - he takes Amlodipine 5mg daily and Lisinopril-HCTZ 20-25mg daily.  He denies having any complaints today.    Hyperlipidemia - he takes Atorvastatin 20mg nightly.  He denies myalgia.    ED - he reports having difficulty getting an erection.  He takes medication daily PRN and it helps with his symptoms.  He denies having any problems taking this medication.          Objective   Vital Signs:   Vitals:    12/13/24 1515 12/13/24 1608   BP: 152/84 148/88  Comment: re-checked   BP Location: Left arm Left arm   Patient Position: Sitting Sitting   Cuff Size: Adult Adult   Pulse: 72    SpO2: 96%    Weight: 73.3 kg (161 lb 9.6 oz)    Height: 177.8 cm (70\")        BMI is within normal parameters. No other follow-up for BMI required.        Physical Exam  Vitals and nursing note reviewed.   Constitutional:       General: He is not in acute distress.     Appearance: Normal appearance. He is not ill-appearing.   HENT:      Head: Normocephalic and atraumatic.   Cardiovascular:      Rate and Rhythm: Normal rate and regular rhythm.      Heart sounds: Normal heart sounds. No murmur heard.  Pulmonary:      Effort: Pulmonary effort is normal. No respiratory distress.      Breath sounds: Normal breath sounds. No wheezing.   Musculoskeletal:      Right lower leg: No edema.      Left lower leg: No edema.   Neurological:      Mental Status: He is alert.          Result Review :     The following data was reviewed by: ASHLIE Schmitz on 12/13/2024:      CBC & Differential (10/21/2024 12:07)  Comprehensive Metabolic Panel (10/21/2024 12:07)  Lipid Panel (10/21/2024 12:07)     Assessment and Plan    Assessment & Plan  Primary hypertension  Hypertension is stable.  Decrease table salt and foods " high in salt.  Weight loss.  Increase activity daily.  Continue Amlodipine 5mg daily and Lisinopril-HCTZ 20-25mg daily.  Blood pressure will be re-assessed in 3 months.    Orders:    amLODIPine (NORVASC) 5 MG tablet; Take 1 tablet by mouth Daily.    lisinopril-hydrochlorothiazide (PRINZIDE,ZESTORETIC) 20-25 MG per tablet; Take 1 tablet by mouth Daily.    Elevated lipoprotein(a)  Lipid abnormalities are uncontrolled on Atorvastatin 20mg nightly.  Encouraged lifestyle changes.  Will re-assess lipids next visit.    Orders:    atorvastatin (LIPITOR) 20 MG tablet; Take 1 tablet by mouth Every Night. NEED APPOINTMENT FOR FUTURE REFILL.    Erectile dysfunction, unspecified erectile dysfunction type  Controlled on sildenafil 100mg daily PRN.  Continue current treatment plan.  Will continue monitor.    Orders:    sildenafil (VIAGRA) 100 MG tablet; Take 1 tablet by mouth Daily As Needed for Erectile Dysfunction.         Follow Up   Return in about 3 months (around 3/13/2025) for Next scheduled follow up - HTN, HLD, ED, Anemia.  Patient was given instructions and counseling regarding his condition or for health maintenance advice. Please see specific information pulled into the AVS if appropriate.

## 2025-02-05 ENCOUNTER — OFFICE VISIT (OUTPATIENT)
Dept: FAMILY MEDICINE CLINIC | Facility: CLINIC | Age: 60
End: 2025-02-05
Payer: COMMERCIAL

## 2025-02-05 VITALS
HEART RATE: 63 BPM | OXYGEN SATURATION: 98 % | WEIGHT: 168 LBS | SYSTOLIC BLOOD PRESSURE: 128 MMHG | HEIGHT: 70 IN | BODY MASS INDEX: 24.05 KG/M2 | DIASTOLIC BLOOD PRESSURE: 80 MMHG

## 2025-02-05 DIAGNOSIS — I10 PRIMARY HYPERTENSION: ICD-10-CM

## 2025-02-05 DIAGNOSIS — A64 STI (SEXUALLY TRANSMITTED INFECTION): Primary | ICD-10-CM

## 2025-02-05 PROCEDURE — 99214 OFFICE O/P EST MOD 30 MIN: CPT | Performed by: INTERNAL MEDICINE

## 2025-02-05 RX ORDER — ACYCLOVIR 400 MG/1
400 TABLET ORAL
Qty: 25 TABLET | Refills: 1 | Status: SHIPPED | OUTPATIENT
Start: 2025-02-05

## 2025-02-05 NOTE — PROGRESS NOTES
02/05/2025    Summarization of Visit       Assessment & Plan     Assessment & Plan  1. Suspected herpes infection.  The patient's symptoms, including blisters on the glans penis and a tingling sensation, suggest a herpes infection. A blood test will be conducted today to confirm the diagnosis. A prescription for a topical cream has been provided to alleviate the current symptoms. Additionally, oral medication has been prescribed, with instructions to take it now following directions and then repeat when blisters fappear again. Refills for the oral medication will be available for future outbreaks.    2. Hypertension.  His hypertension is currently well-managed. He will continue to be monitored by Nurse Practitioner Tesha for this condition.    Results      BMI is within normal parameters. No other follow-up for BMI required.           CC: Rash (Rash is burning /right knee pain /)  .        HPI  History of Present Illness   History of Present Illness  The patient is a 59-year-old male who presents for evaluation of blisters on the glans penis. He is normally seen by nurse practitioner Tesha.    He reports the development of a rash or blister-like lesions on the glans penis several days prior. Despite the resolution of the blisters, he continues to experience an unusual sensation in the area. He has a history of similar episodes but does not report any severe pain, only a persistent tingling sensation on the glans. He is not experiencing any systemic symptoms such as chills or fever. He is  and has children. He attempted self-treatment with an over-the-counter topical preparation, which did not provide significant relief.    SOCIAL HISTORY  He is  and has children.    Sunitha Newman is a 59 y.o. male.      The following portions of the patient's history were reviewed and updated as appropriate: allergies, current medications, past family history, past medical history, past social history,  past surgical history, and problem list.    Problem List  Patient Active Problem List   Diagnosis   • Hypertension   • Hyperlipidemia   • Bradycardia   • Iron deficiency anemia   • Internal hemorrhoids   • External hemorrhoid   • AVM (arteriovenous malformation) of stomach, acquired   • Diverticulosis   • SOB (shortness of breath) on exertion   • Loud snoring   • Hypertrophy of nasal turbinates   • Erectile dysfunction   • Hiatal hernia   • Submandibular lymphadenopathy   • Tongue lesion   • Non compliance with medical treatment       Past Medical History  Past Medical History:   Diagnosis Date   • Erectile dysfunction 8/1/2018   • Hypertension 7/1/2015       Surgical History  Past Surgical History:   Procedure Laterality Date   • APPENDECTOMY  5/10/2010   • COLONOSCOPY  2021   • COLONOSCOPY N/A 7/18/2023    Procedure: COLONOSCOPY into the cecum;  Surgeon: Ezekiel Gomez MD;  [facility] END[facility];  Service: General;  Laterality: N/A;  preop-hiatal hernia;  iron deficiency anemia  postop-hemorrhoids; diverticulosis; abnormal mucosa   • ENDOSCOPY N/A 7/18/2023    Procedure: Upper endoscopy with biopsy;  AVM; clip placed;  Surgeon: Ezekiel Gomez MD;  Location: [facility] ENDOSCOPY;  Service: General;  Laterality: N/A;  preop-hiatal hernia; iron deficiency anemia  postop-AVM- Gastric body       Family History  Family History   Problem Relation Age of Onset   • Hyperlipidemia Father    • Vision loss Father        Social History  Social History    Tobacco Use      Smoking status: Never        Passive exposure: Never      Smokeless tobacco: Never       Is the Patient a current tobacco user? No    Allergies  No Known Allergies    Current Medications    Current Outpatient Medications:   •  amLODIPine (NORVASC) 5 MG tablet, Take 1 tablet by mouth Daily., Disp: 90 tablet, Rfl: 1  •  atorvastatin (LIPITOR) 20 MG tablet, Take 1 tablet by mouth Every Night. NEED APPOINTMENT FOR FUTURE REFILL., Disp: 90  tablet, Rfl: 1  •  lisinopril-hydrochlorothiazide (PRINZIDE,ZESTORETIC) 20-25 MG per tablet, Take 1 tablet by mouth Daily., Disp: 90 tablet, Rfl: 1  •  loratadine (CLARITIN) 10 MG tablet, Take 1 tablet by mouth Daily., Disp: 90 tablet, Rfl: 1  •  sildenafil (VIAGRA) 100 MG tablet, Take 1 tablet by mouth Daily As Needed for Erectile Dysfunction., Disp: 30 tablet, Rfl: 2  •  acyclovir (ZOVIRAX) 400 MG tablet, Take 1 tablet by mouth 5 (Five) Times a Day. Take no more than 5 doses a day., Disp: 25 tablet, Rfl: 1     Review of System  Review of Systems   Constitutional:  Negative for fatigue and fever.   HENT:  Negative for congestion, rhinorrhea and sore throat.    Respiratory:  Negative for cough and shortness of breath.    Gastrointestinal:  Negative for diarrhea and vomiting.   Skin:  Positive for rash.   I have reviewed and confirmed the accuracy of the ROS as documented by the MA/LPN/RN Issac Bran MD    Vitals:    02/05/25 0747   BP: 128/80   Pulse: 63   SpO2: 98%     Body mass index is 24.11 kg/m².    Objective     Physical Exam  Physical Exam  Skin:     Findings: Lesion present.      Comments: Crusting lesions along the glans       Patient or patient representative verbalized consent for the use of Ambient Listening during the visit with  Issac Bran MD for chart documentation. 2/5/2025  08:31 EST    Issac Bran MD  02/05/2025

## 2025-02-06 ENCOUNTER — TELEPHONE (OUTPATIENT)
Dept: FAMILY MEDICINE CLINIC | Facility: CLINIC | Age: 60
End: 2025-02-06
Payer: COMMERCIAL

## 2025-02-06 LAB
HAV IGM SERPL QL IA: NEGATIVE
HBV CORE IGM SERPL QL IA: NEGATIVE
HBV SURFACE AG SERPL QL IA: NEGATIVE
HCV AB SERPL QL IA: NORMAL
HCV IGG SERPL QL IA: NON REACTIVE
HIV 1+2 AB+HIV1 P24 AG SERPL QL IA: NON REACTIVE
RPR SER QL: NON REACTIVE

## 2025-02-06 NOTE — TELEPHONE ENCOUNTER
----- Message from Issac Bran sent at 2/6/2025  4:15 PM EST -----  Call patient to inform that results were normal

## 2025-02-07 LAB
HCV RNA SERPL NAA+PROBE-ACNC: NORMAL IU/ML
REF LAB TEST REF RANGE: NORMAL

## 2025-02-08 LAB
HSV1 DNA SPEC QL NAA+PROBE: NEGATIVE
HSV2 DNA SPEC QL NAA+PROBE: NEGATIVE

## 2025-05-27 DIAGNOSIS — I10 PRIMARY HYPERTENSION: Chronic | ICD-10-CM

## 2025-05-27 DIAGNOSIS — E78.41 ELEVATED LIPOPROTEIN(A): Chronic | ICD-10-CM

## 2025-05-27 RX ORDER — ATORVASTATIN CALCIUM 20 MG/1
20 TABLET, FILM COATED ORAL NIGHTLY
Qty: 90 TABLET | Refills: 1 | Status: SHIPPED | OUTPATIENT
Start: 2025-05-27

## 2025-05-27 RX ORDER — LISINOPRIL AND HYDROCHLOROTHIAZIDE 20; 25 MG/1; MG/1
1 TABLET ORAL DAILY
Qty: 90 TABLET | Refills: 1 | Status: SHIPPED | OUTPATIENT
Start: 2025-05-27

## 2025-07-22 ENCOUNTER — OFFICE VISIT (OUTPATIENT)
Dept: FAMILY MEDICINE CLINIC | Facility: CLINIC | Age: 60
End: 2025-07-22
Payer: COMMERCIAL

## 2025-07-22 VITALS
SYSTOLIC BLOOD PRESSURE: 146 MMHG | HEART RATE: 62 BPM | OXYGEN SATURATION: 100 % | WEIGHT: 164 LBS | BODY MASS INDEX: 23.48 KG/M2 | TEMPERATURE: 97.7 F | HEIGHT: 70 IN | DIASTOLIC BLOOD PRESSURE: 90 MMHG

## 2025-07-22 DIAGNOSIS — R22.32 LUMP OF SKIN OF LEFT UPPER EXTREMITY: ICD-10-CM

## 2025-07-22 DIAGNOSIS — M25.561 LATERAL KNEE PAIN, RIGHT: Primary | ICD-10-CM

## 2025-07-22 DIAGNOSIS — R51.9 NONINTRACTABLE HEADACHE, UNSPECIFIED CHRONICITY PATTERN, UNSPECIFIED HEADACHE TYPE: ICD-10-CM

## 2025-07-22 PROCEDURE — 99213 OFFICE O/P EST LOW 20 MIN: CPT | Performed by: NURSE PRACTITIONER

## 2025-07-22 NOTE — PROGRESS NOTES
"Chief Complaint  Headache, Cyst (Left shoulder), and Knee Pain    Subjective        HPI     Gorge presents to [facility] with complaint of Lump Left Shoulder, Headache and Right Knee Pain:    History of Present Illness    Lump Left Shoulder - He has been experiencing a painless lump on his left shoulder for approximately 1.5 months. He reports no pain in the area but mentions occasional pain in the trapezius muscle during sleep. He has applied Voltaren gel to the area, which he found beneficial. The lump is not present on the right side.    Headaches - He has been experiencing generalized headaches for the past 2 weeks, which are not regular. He does not work in a hot environment. He experienced a mild headache yesterday morning, which he rated as a 4 on a scale of 1 to 10. The pain was described as an ache, without any throbbing sensation. He does not have a headache today. These headaches typically occur in the morning and are alleviated by taking 2 tablets of extra strength Tylenol.    Right Lateral Knee Pain - He reports intermittent aching pain in the lateral part of his right knee, which he rates as a 5 on a scale of 1 to 10. The pain intensifies when he is moving or walking and is relieved by applying a bandage and using diclofenac gel. He does not have knee pain today but experienced it yesterday. This issue has been ongoing for about a year. He has a history of spraining his knee while playing soccer in the past.  He denies having knee fracture.    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 No other follow-up for BMI required.        Physical Exam  Vitals and " nursing note reviewed.   Constitutional:       General: He is not in acute distress.     Appearance: Normal appearance. He is not ill-appearing.   HENT:      Head: Normocephalic and atraumatic.   Cardiovascular:      Rate and Rhythm: Normal rate and regular rhythm.   Pulmonary:      Effort: Pulmonary effort is normal. No respiratory distress.      Breath sounds: Normal breath sounds. No wheezing.   Musculoskeletal:      Left shoulder: No swelling, deformity, effusion, laceration, tenderness, bony tenderness or crepitus. Normal range of motion. Normal strength. Normal pulse.        Arms:       Cervical back: Neck supple. No tenderness.      Right knee: No swelling, deformity, effusion, erythema, ecchymosis, lacerations or bony tenderness. Normal range of motion. No tenderness.      Left knee: Deformity and bony tenderness present. No swelling, effusion, erythema, ecchymosis or lacerations. Normal range of motion. No tenderness.        Legs:       Comments: Non-tender on palpation over approx 1cm mobile lump on top left lateral shoulder; no left shoulder joint tenderness; normal left shoulder AROM; no erythema, swelling or drainage.    Tenderness on palpation over right lateral knee joint bony prominence; normal AROM; normal strength; no erythema, swelling or bruising.   Lymphadenopathy:      Cervical: No cervical adenopathy.   Neurological:      Mental Status: He is alert.      Motor: No weakness.      Gait: Gait normal.          Result Review :     The following data was reviewed by: ASHLIE Schmitz on 07/22/2025:      Comprehensive Metabolic Panel (10/21/2024 12:07)      Assessment and Plan    Assessment & Plan  Lateral knee pain, right  RICE.  Gentle stretch/exercise daily.  Apply ice for 10-15 mins, 2-3 times a day to affected area PRN  Rx:  Diclofenac Gel 1% BID PRN - apply sparingly to affected area  DO NOT APPLY ice/heat over any medicated cream/gel/ointment/patch - remove patch/wash site prior to  applying heat/ice.  Recommended referral to PT and patient declined.  Order:  XR Right Knee  Referral Orthopedic for eval/treat.    Orders:    Diclofenac Sodium (VOLTAREN) 1 % gel gel; Apply 4 g topically to the appropriate area as directed 2 (Two) Times a Day As Needed (cervical pain and stiffness).    Ambulatory Referral to Orthopedic Surgery    XR Knee 1 or 2 View Right    Lump of skin of left upper extremity  Lump of skin on left lateral shoulder.  Discussed probable lipoma and discussed watching and waiting and patient agreed.  Follow-up if si/sy worsen or fail to improve.         Nonintractable headache, unspecified chronicity pattern, unspecified headache type  Drink water to stay hydrated.  Avoid caffeine/ETOH.  Eat 3 nutritional meals daily.  Take (OTC) Tylenol PRN, per package instructions.  Avoid ETOH when taking Tylenol.  Follow-up if si/sy worsen or fail to improve.           Follow Up   Return in about 3 months (around 10/22/2025) for Annual physical.  Patient was given instructions and counseling regarding his condition or for health maintenance advice. Please see specific information pulled into the AVS if appropriate.   Patient or patient representative verbalized consent for the use of Ambient Listening during the visit with  ASHLIE Schmitz for chart documentation. 7/22/25  10:20 EDT

## 2025-08-19 ENCOUNTER — OFFICE VISIT (OUTPATIENT)
Dept: FAMILY MEDICINE CLINIC | Facility: CLINIC | Age: 60
End: 2025-08-19
Payer: COMMERCIAL

## 2025-08-19 VITALS
TEMPERATURE: 97.5 F | DIASTOLIC BLOOD PRESSURE: 90 MMHG | HEART RATE: 53 BPM | SYSTOLIC BLOOD PRESSURE: 160 MMHG | HEIGHT: 70 IN | OXYGEN SATURATION: 98 % | BODY MASS INDEX: 23.45 KG/M2 | WEIGHT: 163.8 LBS

## 2025-08-19 DIAGNOSIS — K12.1 ORAL ULCERATION: Primary | ICD-10-CM

## 2025-08-19 PROCEDURE — 99213 OFFICE O/P EST LOW 20 MIN: CPT | Performed by: NURSE PRACTITIONER
